# Patient Record
Sex: FEMALE | Race: BLACK OR AFRICAN AMERICAN | Employment: FULL TIME | URBAN - METROPOLITAN AREA
[De-identification: names, ages, dates, MRNs, and addresses within clinical notes are randomized per-mention and may not be internally consistent; named-entity substitution may affect disease eponyms.]

---

## 2017-01-05 ENCOUNTER — OFFICE VISIT (OUTPATIENT)
Dept: INTERNAL MEDICINE CLINIC | Facility: CLINIC | Age: 47
End: 2017-01-05

## 2017-01-05 VITALS
BODY MASS INDEX: 38.37 KG/M2 | DIASTOLIC BLOOD PRESSURE: 88 MMHG | TEMPERATURE: 98 F | OXYGEN SATURATION: 99 % | HEART RATE: 73 BPM | SYSTOLIC BLOOD PRESSURE: 122 MMHG | HEIGHT: 67 IN | WEIGHT: 244.5 LBS | RESPIRATION RATE: 17 BRPM

## 2017-01-05 DIAGNOSIS — K92.1 BLOOD IN STOOL: Primary | ICD-10-CM

## 2017-01-05 PROCEDURE — 99214 OFFICE O/P EST MOD 30 MIN: CPT | Performed by: INTERNAL MEDICINE

## 2017-01-05 NOTE — PROGRESS NOTES
Patient presents with:  Blood In Stool: blood in stools x a couple weeks  Back Pain      HPI: The pt presents today for BRBPR. Onset = 2-3 weeks. No prior hx. Noticed blood on the toilet paper \"about 80% of the time. \"  Some constipation. No diarrhea.

## 2017-01-05 NOTE — PATIENT INSTRUCTIONS
When You Have Gastrointestinal (GI) Bleeding    Blood in your vomit or stool can be a sign of gastrointestinal (GI) bleeding. GI bleeding can be scary. But the cause may not be serious. You should always see a doctor if GI bleeding occurs.   The GI tract · Blood tests. A blood sample is taken and sent to a lab for exam.  · Hemoccult test. Checks a stool sample for blood. · Stool culture. Checks a stool sample for bacteria or parasites. · X-ray, ultrasound, or CT scan.  Imaging tests that take pictures of © 8446-1420 [address], [address] [place]. All rights reserved. This information is not intended as a substitute for professional medical care. Always follow your healthcare professional's instructions.

## 2017-01-30 ENCOUNTER — OFFICE VISIT (OUTPATIENT)
Dept: SURGERY | Facility: CLINIC | Age: 47
End: 2017-01-30

## 2017-01-30 VITALS
DIASTOLIC BLOOD PRESSURE: 73 MMHG | BODY MASS INDEX: 39.21 KG/M2 | RESPIRATION RATE: 16 BRPM | WEIGHT: 244 LBS | HEART RATE: 85 BPM | TEMPERATURE: 98 F | HEIGHT: 66 IN | SYSTOLIC BLOOD PRESSURE: 122 MMHG

## 2017-01-30 DIAGNOSIS — Z80.0 FAMILY HISTORY OF MALIGNANT NEOPLASM OF COLON: Primary | ICD-10-CM

## 2017-01-30 DIAGNOSIS — K92.1 BLOOD IN STOOL: ICD-10-CM

## 2017-01-30 DIAGNOSIS — E66.9 OBESITY (BMI 30-39.9): ICD-10-CM

## 2017-01-30 PROCEDURE — 99243 OFF/OP CNSLTJ NEW/EST LOW 30: CPT | Performed by: SURGERY

## 2017-01-30 RX ORDER — POLYETHYLENE GLYCOL 3350, SODIUM CHLORIDE, SODIUM BICARBONATE, POTASSIUM CHLORIDE 420; 11.2; 5.72; 1.48 G/4L; G/4L; G/4L; G/4L
POWDER, FOR SOLUTION ORAL
Qty: 1 BOTTLE | Refills: 0 | Status: SHIPPED | OUTPATIENT
Start: 2017-01-30 | End: 2017-03-03

## 2017-01-30 NOTE — H&P
New Patient Visit Note       Active Problems      1. Family history of malignant neoplasm of colon    2. Blood in stool    3. Obesity (BMI 30-39. 9)        Chief Complaint   Anal Problem (GI)    History of Present Illness     Pt seen at the request of Dr. Willie Overton History  Occupation          Employer            Comment               principal                                   Social History Main Topics    Smoking Status: Never Smoker                      Alcohol Use: Yes           0.6 oz/week       1 Standard drink 12/01/2011  Physical Exam   Constitutional: She appears well-developed and well-nourished. No distress. HENT:   Head: Normocephalic. Neck: Normal range of motion. No tracheal deviation present.    Cardiovascular: Normal rate, regular rhythm and normal h

## 2017-03-03 PROBLEM — K64.8 INTERNAL HEMORRHOIDS WITHOUT COMPLICATION: Status: ACTIVE | Noted: 2017-03-03

## 2017-07-11 ENCOUNTER — OFFICE VISIT (OUTPATIENT)
Dept: INTERNAL MEDICINE CLINIC | Facility: CLINIC | Age: 47
End: 2017-07-11

## 2017-07-11 VITALS
HEIGHT: 66 IN | BODY MASS INDEX: 40.02 KG/M2 | DIASTOLIC BLOOD PRESSURE: 82 MMHG | TEMPERATURE: 98 F | SYSTOLIC BLOOD PRESSURE: 120 MMHG | RESPIRATION RATE: 16 BRPM | WEIGHT: 249 LBS | HEART RATE: 88 BPM

## 2017-07-11 DIAGNOSIS — N76.0 ACUTE VAGINITIS: Primary | ICD-10-CM

## 2017-07-11 DIAGNOSIS — Z80.0 FAMILY HISTORY OF COLON CANCER: ICD-10-CM

## 2017-07-11 DIAGNOSIS — Z80.3 FAMILY HISTORY OF BREAST CANCER: ICD-10-CM

## 2017-07-11 DIAGNOSIS — E66.01 MORBID OBESITY WITH BMI OF 40.0-44.9, ADULT (HCC): ICD-10-CM

## 2017-07-11 DIAGNOSIS — N89.8 VAGINAL DRYNESS: ICD-10-CM

## 2017-07-11 PROCEDURE — 87591 N.GONORRHOEAE DNA AMP PROB: CPT | Performed by: PHYSICIAN ASSISTANT

## 2017-07-11 PROCEDURE — 87491 CHLMYD TRACH DNA AMP PROBE: CPT | Performed by: PHYSICIAN ASSISTANT

## 2017-07-11 PROCEDURE — 87660 TRICHOMONAS VAGIN DIR PROBE: CPT | Performed by: PHYSICIAN ASSISTANT

## 2017-07-11 PROCEDURE — 87480 CANDIDA DNA DIR PROBE: CPT | Performed by: PHYSICIAN ASSISTANT

## 2017-07-11 PROCEDURE — 99214 OFFICE O/P EST MOD 30 MIN: CPT | Performed by: PHYSICIAN ASSISTANT

## 2017-07-11 PROCEDURE — 87510 GARDNER VAG DNA DIR PROBE: CPT | Performed by: PHYSICIAN ASSISTANT

## 2017-07-11 NOTE — PATIENT INSTRUCTIONS
Vaginal Symptoms:  - may try a probiotic or yogurt daily to replace the \"good bacteria\"  - wear cotton underwear  - avoid tight fitting pants  - drink plenty of water each day  - may try Estroven (over the counter)    Follow up visit in September for you

## 2017-07-11 NOTE — PROGRESS NOTES
HPI:  Chyna Little is a 48year old female who presents for vaginal symptoms x 2 weeks. C/o vaginal itching, discharge - clear to white, no odor. Denies fever, chills, dysuria, hematuria, increased urinary urgency, frequency.   Tried 7 days of Monistat hepatosplenomegaly  BACK: no CVA tenderness  : normal external genitalia, no skin lesions or rashes noted, vaginal canal noted with white clumpy discharge    ASSESSMENT AND PLAN:  # Vaginitis: Sureswab & gc/chlamydia pending.   If positive for BV will be

## 2017-07-12 LAB
C TRACH DNA SPEC QL NAA+PROBE: NEGATIVE
N GONORRHOEA DNA SPEC QL NAA+PROBE: NEGATIVE

## 2017-07-12 RX ORDER — METRONIDAZOLE 500 MG/1
500 TABLET ORAL 2 TIMES DAILY
Qty: 14 TABLET | Refills: 0 | Status: SHIPPED | OUTPATIENT
Start: 2017-07-12 | End: 2017-07-19

## 2017-07-12 RX ORDER — FLUCONAZOLE 150 MG/1
TABLET ORAL
Qty: 2 TABLET | Refills: 0 | Status: SHIPPED | OUTPATIENT
Start: 2017-07-12 | End: 2017-10-12 | Stop reason: ALTCHOICE

## 2017-07-12 RX ORDER — METRONIDAZOLE 7.5 MG/G
GEL VAGINAL
Qty: 1 TUBE | Refills: 3 | Status: SHIPPED | OUTPATIENT
Start: 2017-07-12 | End: 2017-10-12 | Stop reason: ALTCHOICE

## 2017-10-12 ENCOUNTER — OFFICE VISIT (OUTPATIENT)
Dept: INTERNAL MEDICINE CLINIC | Facility: CLINIC | Age: 47
End: 2017-10-12

## 2017-10-12 VITALS
TEMPERATURE: 98 F | DIASTOLIC BLOOD PRESSURE: 80 MMHG | HEIGHT: 66 IN | SYSTOLIC BLOOD PRESSURE: 116 MMHG | WEIGHT: 250.25 LBS | BODY MASS INDEX: 40.22 KG/M2 | HEART RATE: 86 BPM

## 2017-10-12 DIAGNOSIS — E66.01 MORBID OBESITY WITH BMI OF 40.0-44.9, ADULT (HCC): ICD-10-CM

## 2017-10-12 DIAGNOSIS — Z00.00 ROUTINE GENERAL MEDICAL EXAMINATION AT A HEALTH CARE FACILITY: Primary | ICD-10-CM

## 2017-10-12 DIAGNOSIS — Z28.21 INFLUENZA VACCINE REFUSED: ICD-10-CM

## 2017-10-12 DIAGNOSIS — Z12.31 ENCOUNTER FOR SCREENING MAMMOGRAM FOR BREAST CANCER: ICD-10-CM

## 2017-10-12 PROBLEM — Z80.3 FAMILY HISTORY OF BREAST CANCER: Status: ACTIVE | Noted: 2017-10-12

## 2017-10-12 PROBLEM — K64.8 INTERNAL HEMORRHOIDS WITHOUT COMPLICATION: Status: RESOLVED | Noted: 2017-03-03 | Resolved: 2017-10-12

## 2017-10-12 PROBLEM — K92.1 BLOOD IN STOOL: Status: RESOLVED | Noted: 2017-01-05 | Resolved: 2017-10-12

## 2017-10-12 PROCEDURE — 99396 PREV VISIT EST AGE 40-64: CPT | Performed by: INTERNAL MEDICINE

## 2017-10-12 NOTE — PROGRESS NOTES
Patient presents with:  CPX      HPI: The pt presents today for WWE + CBE but minus the pap. Health goals still center around longevity, vitality, and QOL. She's due for wellness labs. She's due for mammo as well.   She's up to date on C-scope (done stacey [OTHER] Other      fam hx   • Hypertension Brother    • Obesity Other      fam hx   • Obesity Brother    • Cancer Sister 43     breast CA   • OA [OTHER] Sister    • Breast Cancer Paternal Aunt    • Breast Cancer Paternal Aunt          Immunization History Encounter      HGB A1C - CPX      LIPID PANEL - CPX      TSH - CPX      URINALYSIS, ROUTINE - CPX      VITAMIN D, 25-HYDROXY - CPX      CBC W/DIFF - CPX      COMP METABOLIC PANEL - CPX      Flulaval 0.5 ml >= 6 months Quad single dose Pres Free (22795)

## 2017-10-13 ENCOUNTER — HOSPITAL ENCOUNTER (OUTPATIENT)
Dept: MAMMOGRAPHY | Age: 47
Discharge: HOME | End: 2017-10-13
Attending: INTERNAL MEDICINE
Payer: COMMERCIAL

## 2017-10-13 DIAGNOSIS — Z12.31 ENCOUNTER FOR SCREENING MAMMOGRAM FOR BREAST CANCER: ICD-10-CM

## 2017-10-13 PROCEDURE — 77063 BREAST TOMOSYNTHESIS BI: CPT | Performed by: INTERNAL MEDICINE

## 2017-10-13 PROCEDURE — 77067 SCR MAMMO BI INCL CAD: CPT | Performed by: INTERNAL MEDICINE

## 2017-10-16 ENCOUNTER — LAB ENCOUNTER (OUTPATIENT)
Dept: LAB | Age: 47
End: 2017-10-16
Attending: INTERNAL MEDICINE
Payer: COMMERCIAL

## 2017-10-16 DIAGNOSIS — Z00.00 ROUTINE GENERAL MEDICAL EXAMINATION AT A HEALTH CARE FACILITY: ICD-10-CM

## 2017-10-16 DIAGNOSIS — E55.9 VITAMIN D DEFICIENCY: Primary | ICD-10-CM

## 2017-10-16 PROCEDURE — 83036 HEMOGLOBIN GLYCOSYLATED A1C: CPT | Performed by: INTERNAL MEDICINE

## 2017-10-16 PROCEDURE — 80061 LIPID PANEL: CPT | Performed by: INTERNAL MEDICINE

## 2017-10-16 PROCEDURE — 36415 COLL VENOUS BLD VENIPUNCTURE: CPT | Performed by: INTERNAL MEDICINE

## 2017-10-16 PROCEDURE — 80050 GENERAL HEALTH PANEL: CPT | Performed by: INTERNAL MEDICINE

## 2017-10-16 PROCEDURE — 81001 URINALYSIS AUTO W/SCOPE: CPT | Performed by: INTERNAL MEDICINE

## 2017-10-16 PROCEDURE — 82306 VITAMIN D 25 HYDROXY: CPT | Performed by: INTERNAL MEDICINE

## 2017-10-16 RX ORDER — ERGOCALCIFEROL 1.25 MG/1
50000 CAPSULE ORAL
Qty: 12 CAPSULE | Refills: 0 | Status: SHIPPED | OUTPATIENT
Start: 2017-10-16 | End: 2018-01-22 | Stop reason: ALTCHOICE

## 2017-10-16 NOTE — PROGRESS NOTES
Script for Vitamin D sent to her pharmacy. Marques Nieto. Miriam Mak MD  Diplomate, American Board of Internal Medicine  [facility]  [address] [facility],[address], [address], [facility], [address] [place] [address]  T: [phone]; F: Cristianeti 5

## 2018-01-22 ENCOUNTER — OFFICE VISIT (OUTPATIENT)
Dept: INTERNAL MEDICINE CLINIC | Facility: CLINIC | Age: 48
End: 2018-01-22

## 2018-01-22 VITALS
DIASTOLIC BLOOD PRESSURE: 80 MMHG | SYSTOLIC BLOOD PRESSURE: 112 MMHG | WEIGHT: 256.5 LBS | TEMPERATURE: 98 F | HEART RATE: 80 BPM | HEIGHT: 66 IN | BODY MASS INDEX: 41.22 KG/M2

## 2018-01-22 DIAGNOSIS — E78.00 HYPERCHOLESTEREMIA: ICD-10-CM

## 2018-01-22 DIAGNOSIS — E66.01 MORBID OBESITY WITH BMI OF 40.0-44.9, ADULT (HCC): ICD-10-CM

## 2018-01-22 DIAGNOSIS — E55.9 VITAMIN D DEFICIENCY: ICD-10-CM

## 2018-01-22 DIAGNOSIS — R68.89 FLU-LIKE SYMPTOMS: Primary | ICD-10-CM

## 2018-01-22 PROCEDURE — 99214 OFFICE O/P EST MOD 30 MIN: CPT | Performed by: INTERNAL MEDICINE

## 2018-01-22 RX ORDER — OSELTAMIVIR PHOSPHATE 75 MG/1
75 CAPSULE ORAL 2 TIMES DAILY
Qty: 10 CAPSULE | Refills: 0 | Status: SHIPPED | OUTPATIENT
Start: 2018-01-22 | End: 2018-01-27

## 2018-01-22 NOTE — PROGRESS NOTES
Daniela Miranda is a 50year old female. HPI:   Patient presents with:  Headache: headaches, nause  Body ache and/or chills: hot and chills   Patient presents with flu-like symptoms. Headaches, nausea, body aches, chills, chest tightness.     A lot of of alcohol per week . She reports that she does not use drugs.   Wt Readings from Last 6 Encounters:  01/22/18 : 256 lb 8 oz  10/12/17 : 250 lb 4 oz  07/11/17 : 249 lb  03/03/17 : 210 lb  01/30/17 : 244 lb  01/05/17 : 244 lb 8 oz    EXAM:   /80 (BP Lo clinic in October 2018 with Dr. Franklin Ayala MD for follow up on chronic issues, or earlier if acute issues arise.     Desire Sharpe MD

## 2018-06-12 ENCOUNTER — TELEPHONE (OUTPATIENT)
Dept: INTERNAL MEDICINE CLINIC | Facility: CLINIC | Age: 48
End: 2018-06-12

## 2018-06-12 NOTE — TELEPHONE ENCOUNTER
Patient dropped off Health Exam Form from employer, [employer], to be signed. Form sent to [place] with daily mail/fax.   Advise patient when ready for pickup

## 2018-07-03 ENCOUNTER — TELEPHONE (OUTPATIENT)
Dept: INTERNAL MEDICINE CLINIC | Facility: CLINIC | Age: 48
End: 2018-07-03

## 2018-07-03 ENCOUNTER — OFFICE VISIT (OUTPATIENT)
Dept: INTERNAL MEDICINE CLINIC | Facility: CLINIC | Age: 48
End: 2018-07-03

## 2018-07-03 VITALS
WEIGHT: 255.5 LBS | BODY MASS INDEX: 41 KG/M2 | HEART RATE: 67 BPM | OXYGEN SATURATION: 97 % | RESPIRATION RATE: 16 BRPM | DIASTOLIC BLOOD PRESSURE: 78 MMHG | SYSTOLIC BLOOD PRESSURE: 108 MMHG | TEMPERATURE: 98 F

## 2018-07-03 DIAGNOSIS — M25.561 ACUTE PAIN OF RIGHT KNEE: Primary | ICD-10-CM

## 2018-07-03 PROCEDURE — 99213 OFFICE O/P EST LOW 20 MIN: CPT | Performed by: NURSE PRACTITIONER

## 2018-07-03 RX ORDER — FENOPROFEN CALCIUM 200 MG/1
300 CAPSULE ORAL 2 TIMES DAILY
Qty: 28 CAPSULE | Refills: 0 | Status: SHIPPED | OUTPATIENT
Start: 2018-07-03 | End: 2018-07-03 | Stop reason: CLARIF

## 2018-07-03 RX ORDER — FENOPROFEN CALCIUM 200 MG/1
200 CAPSULE ORAL 2 TIMES DAILY
Qty: 28 CAPSULE | Refills: 0 | Status: SHIPPED | OUTPATIENT
Start: 2018-07-03 | End: 2018-08-02

## 2018-07-03 NOTE — TELEPHONE ENCOUNTER
Pharmacy would like a call back medication dos not come in that strength and can not be cut in half Fenoprofen Calcium 200 MG

## 2018-07-03 NOTE — PROGRESS NOTES
CHIEF COMPLAINT:     Patient presents with:  Knee Pain: Right knee pain x 3 weeks. Possibly dt arthritis. Extremely tender especially while walking upstairs. Using Advil with no relief.       HPI:   Daniela Miranda is a 50year old female who presents to NEURO: No numbness, no tingling, No loss of sensation.     EXAM:   /78 (BP Location: Right arm, Patient Position: Sitting, Cuff Size: large)   Pulse 67   Temp 98 °F (36.7 °C) (Oral)   Resp 16   Wt 255 lb 8 oz   LMP 12/01/2011   SpO2 97%   Breastfeedin R. I.C.E. stands for Rest, Ice, Compression, and Elevation. Doing these things helps limit pain and swelling after an injury. R.I.C.E. also helps injuries heal faster. Use R.I.C.E. for sprains, strains, and severe bruises or bumps.  Follow the tips on this h The patient indicates understanding of these issues and agrees to the plan.

## 2018-07-10 ENCOUNTER — TELEPHONE (OUTPATIENT)
Dept: INTERNAL MEDICINE CLINIC | Facility: CLINIC | Age: 48
End: 2018-07-10

## 2018-07-10 NOTE — TELEPHONE ENCOUNTER
Spoke with Jenniffer Garland, pharmacist at [facility]. He is asking if provider is ok with Naproxen 500mg 1 tab BID 28 tabs. Spoke with DAVID Yeboah who stated that was ok as long as went through insurance and if not for pt to take OTC aleve BID.  Pharmacist ilya

## 2018-07-10 NOTE — TELEPHONE ENCOUNTER
That was the med insurance wanted. Please just have pt take OTC aleve BID as instructed. Please tell her it is equivalent.

## 2018-07-10 NOTE — TELEPHONE ENCOUNTER
[facility] pharmacy called requesting alternative medication for anti inflammatory. Patient was prescribed Fenoprofen Calcium 200 MG Oral Cap on 7/3/18, but pharmacy only carries brand name and is too expensive for patient.      [address] [facility] [id number] -

## 2018-09-20 ENCOUNTER — HOSPITAL ENCOUNTER (OUTPATIENT)
Dept: GENERAL RADIOLOGY | Age: 48
Discharge: HOME | End: 2018-09-20
Attending: INTERNAL MEDICINE
Payer: COMMERCIAL

## 2018-09-20 ENCOUNTER — OFFICE VISIT (OUTPATIENT)
Dept: INTERNAL MEDICINE CLINIC | Facility: CLINIC | Age: 48
End: 2018-09-20
Payer: COMMERCIAL

## 2018-09-20 VITALS
RESPIRATION RATE: 18 BRPM | OXYGEN SATURATION: 95 % | HEART RATE: 72 BPM | DIASTOLIC BLOOD PRESSURE: 80 MMHG | TEMPERATURE: 98 F | SYSTOLIC BLOOD PRESSURE: 122 MMHG | BODY MASS INDEX: 41.18 KG/M2 | HEIGHT: 66 IN | WEIGHT: 256.25 LBS

## 2018-09-20 DIAGNOSIS — M25.562 ACUTE PAIN OF BOTH KNEES: Primary | ICD-10-CM

## 2018-09-20 DIAGNOSIS — M25.561 ACUTE PAIN OF BOTH KNEES: ICD-10-CM

## 2018-09-20 DIAGNOSIS — M25.561 ACUTE PAIN OF BOTH KNEES: Primary | ICD-10-CM

## 2018-09-20 DIAGNOSIS — M25.562 ACUTE PAIN OF BOTH KNEES: ICD-10-CM

## 2018-09-20 PROCEDURE — 99214 OFFICE O/P EST MOD 30 MIN: CPT | Performed by: INTERNAL MEDICINE

## 2018-09-20 PROCEDURE — 73565 X-RAY EXAM OF KNEES: CPT | Performed by: INTERNAL MEDICINE

## 2018-09-20 RX ORDER — MELOXICAM 7.5 MG/1
7.5 TABLET ORAL DAILY
Qty: 30 TABLET | Refills: 1 | Status: SHIPPED | OUTPATIENT
Start: 2018-09-20 | End: 2019-03-27 | Stop reason: ALTCHOICE

## 2018-09-20 NOTE — PATIENT INSTRUCTIONS
- Get x-ray done of knees  - Start meloxicam (anti-inflammatory). Take 1 tablet daily with food.   - Based on x-ray results and course of your symptoms, we may have you follow up with an orthopedics specialist.    It was a pleasure seeing you in the clinic

## 2018-09-20 NOTE — PROGRESS NOTES
Edwin Cabello is a 50year old female. HPI:   Patient presents with:  Knee Pain: Bilateral knee pain - began in june - right more than  left - Given Naproxen but did not completely take pain away. Patient presents with pain in both knees, right>left. other family member. Social:  reports that  has never smoked. she has never used smokeless tobacco. She reports that she drinks alcohol. She reports that she does not use drugs.   Wt Readings from Last 6 Encounters:  09/20/18 : 256 lb 4 oz  07/03/18 : 255

## 2018-11-01 ENCOUNTER — OFFICE VISIT (OUTPATIENT)
Dept: INTERNAL MEDICINE CLINIC | Facility: CLINIC | Age: 48
End: 2018-11-01
Payer: COMMERCIAL

## 2018-11-01 VITALS
TEMPERATURE: 99 F | WEIGHT: 247 LBS | SYSTOLIC BLOOD PRESSURE: 124 MMHG | RESPIRATION RATE: 16 BRPM | BODY MASS INDEX: 39.7 KG/M2 | HEART RATE: 78 BPM | DIASTOLIC BLOOD PRESSURE: 84 MMHG | HEIGHT: 66 IN

## 2018-11-01 DIAGNOSIS — Z00.00 PREVENTATIVE HEALTH CARE: ICD-10-CM

## 2018-11-01 DIAGNOSIS — Z12.39 ENCOUNTER FOR SCREENING FOR MALIGNANT NEOPLASM OF BREAST: ICD-10-CM

## 2018-11-01 DIAGNOSIS — N89.8 VAGINAL DISCHARGE: Primary | ICD-10-CM

## 2018-11-01 PROCEDURE — 87480 CANDIDA DNA DIR PROBE: CPT | Performed by: INTERNAL MEDICINE

## 2018-11-01 PROCEDURE — 87491 CHLMYD TRACH DNA AMP PROBE: CPT | Performed by: INTERNAL MEDICINE

## 2018-11-01 PROCEDURE — 99213 OFFICE O/P EST LOW 20 MIN: CPT | Performed by: INTERNAL MEDICINE

## 2018-11-01 PROCEDURE — 87510 GARDNER VAG DNA DIR PROBE: CPT | Performed by: INTERNAL MEDICINE

## 2018-11-01 PROCEDURE — 87591 N.GONORRHOEAE DNA AMP PROB: CPT | Performed by: INTERNAL MEDICINE

## 2018-11-01 PROCEDURE — 87660 TRICHOMONAS VAGIN DIR PROBE: CPT | Performed by: INTERNAL MEDICINE

## 2018-11-01 RX ORDER — FLUCONAZOLE 150 MG/1
150 TABLET ORAL ONCE
Qty: 2 TABLET | Refills: 0 | Status: SHIPPED | OUTPATIENT
Start: 2018-11-01 | End: 2018-11-01

## 2018-11-01 NOTE — PATIENT INSTRUCTIONS
- Start yeast infection medication. Take 1 tablet now, and 1 tablet in 3 days if symptoms are still present. - We will contact you with the results of your swabs. It was a pleasure seeing you in the clinic today.   Thank you for choosing the [facility]

## 2018-11-01 NOTE — PROGRESS NOTES
Yuki Nash is a 50year old female. HPI:   Patient presents with:  Yeast Infection  Patient presents with complaints of vaginal discharge. She has been dealing with vaginal itching and light whitish discharge.   Symptoms have been going on for 3-4 reports that she does not use drugs.   Wt Readings from Last 6 Encounters:  11/01/18 : 247 lb  09/20/18 : 256 lb 4 oz  07/03/18 : 255 lb 8 oz  01/22/18 : 256 lb 8 oz  10/12/17 : 250 lb 4 oz  07/11/17 : 249 lb    EXAM:   /84 (BP Location: Left arm, Brenda Regalados

## 2018-11-05 ENCOUNTER — TELEPHONE (OUTPATIENT)
Dept: INTERNAL MEDICINE CLINIC | Facility: CLINIC | Age: 48
End: 2018-11-05

## 2019-03-27 ENCOUNTER — OFFICE VISIT (OUTPATIENT)
Dept: INTERNAL MEDICINE CLINIC | Facility: CLINIC | Age: 49
End: 2019-03-27
Payer: COMMERCIAL

## 2019-03-27 VITALS
RESPIRATION RATE: 12 BRPM | HEART RATE: 84 BPM | DIASTOLIC BLOOD PRESSURE: 84 MMHG | HEIGHT: 66 IN | BODY MASS INDEX: 41.3 KG/M2 | WEIGHT: 257 LBS | SYSTOLIC BLOOD PRESSURE: 120 MMHG | TEMPERATURE: 98 F

## 2019-03-27 DIAGNOSIS — M79.602 LEFT ARM PAIN: Primary | ICD-10-CM

## 2019-03-27 DIAGNOSIS — M72.2 PLANTAR FASCIITIS OF RIGHT FOOT: ICD-10-CM

## 2019-03-27 DIAGNOSIS — Z00.00 PREVENTATIVE HEALTH CARE: ICD-10-CM

## 2019-03-27 PROCEDURE — 99214 OFFICE O/P EST MOD 30 MIN: CPT | Performed by: INTERNAL MEDICINE

## 2019-03-27 RX ORDER — METHYLPREDNISOLONE 4 MG/1
TABLET ORAL
Qty: 1 KIT | Refills: 0 | Status: SHIPPED | OUTPATIENT
Start: 2019-03-27 | End: 2019-09-05 | Stop reason: ALTCHOICE

## 2019-03-27 NOTE — PROGRESS NOTES
Casandra Ovalle is a 46year old female. HPI:   Patient presents with:  Arm Pain: Left fore arm pain  Heel Pain: Right side heel pain   Other: Due for Mammogram (order in epic / advised to make appt) - pt inquiring about regular yearly lab work.    Adal of onset: 43) in her sister; Breast Cancer (age of onset: 39) in her paternal aunt; Cancer (age of onset: 43) in her sister; Cancer (age of onset: 48) in her paternal aunt;  Cancer (age of onset: 61) in her father; Diabetes in her father; Glaucoma in her fa order already in system. Patient with hysterectomy. - CBC WITH DIFFERENTIAL WITH PLATELET; Future  - COMP METABOLIC PANEL (14); Future  - HEMOGLOBIN A1C; Future  - LIPID PANEL;  Future  - TSH W REFLEX TO FREE T4; Future  - URINALYSIS, ROUTINE; Future  \

## 2019-03-27 NOTE — PATIENT INSTRUCTIONS
- Start anti-inflammatory (diclofenac). Take 1 tablet every 8 hours as needed. - Start steroid pack (methylprednisolone). Take as prescribed.   - Continue with plantar fasciitis exercises  - If your arm pain is not better in two weeks, follow up with [facility]

## 2019-03-31 PROBLEM — M72.2 PLANTAR FASCIITIS OF RIGHT FOOT: Status: ACTIVE | Noted: 2019-03-31

## 2019-04-05 ENCOUNTER — LAB ENCOUNTER (OUTPATIENT)
Dept: LAB | Age: 49
End: 2019-04-05
Attending: INTERNAL MEDICINE
Payer: COMMERCIAL

## 2019-04-05 ENCOUNTER — HOSPITAL ENCOUNTER (OUTPATIENT)
Dept: MAMMOGRAPHY | Age: 49
Discharge: HOME | End: 2019-04-05
Attending: INTERNAL MEDICINE
Payer: COMMERCIAL

## 2019-04-05 DIAGNOSIS — Z00.00 PREVENTATIVE HEALTH CARE: ICD-10-CM

## 2019-04-05 DIAGNOSIS — Z12.39 ENCOUNTER FOR SCREENING FOR MALIGNANT NEOPLASM OF BREAST: ICD-10-CM

## 2019-04-05 PROCEDURE — 80053 COMPREHEN METABOLIC PANEL: CPT | Performed by: INTERNAL MEDICINE

## 2019-04-05 PROCEDURE — 77067 SCR MAMMO BI INCL CAD: CPT | Performed by: INTERNAL MEDICINE

## 2019-04-05 PROCEDURE — 83036 HEMOGLOBIN GLYCOSYLATED A1C: CPT | Performed by: INTERNAL MEDICINE

## 2019-04-05 PROCEDURE — 80061 LIPID PANEL: CPT | Performed by: INTERNAL MEDICINE

## 2019-04-05 PROCEDURE — 81001 URINALYSIS AUTO W/SCOPE: CPT | Performed by: INTERNAL MEDICINE

## 2019-04-05 PROCEDURE — 77063 BREAST TOMOSYNTHESIS BI: CPT | Performed by: INTERNAL MEDICINE

## 2019-04-05 PROCEDURE — 36415 COLL VENOUS BLD VENIPUNCTURE: CPT | Performed by: INTERNAL MEDICINE

## 2019-05-08 ENCOUNTER — TELEPHONE (OUTPATIENT)
Dept: INTERNAL MEDICINE CLINIC | Facility: CLINIC | Age: 49
End: 2019-05-08

## 2019-05-08 NOTE — TELEPHONE ENCOUNTER
Pt remains with left arm pain despite Diclofenac. Pt  lost the information for the Ortho Dr Ruthie Magana referred her to. Who do you recommend?

## 2019-05-08 NOTE — TELEPHONE ENCOUNTER
Pt would like a call back from nurse is still having arm pain and would like to see Dr Jossue Hernandez did offer her an apt with Dr Jeanie Summers but pt declined

## 2019-05-08 NOTE — TELEPHONE ENCOUNTER
Jean [address]  [address] [facility], [address]  Phone:  [phone]    If that is too far away she can try Dr. Mary Ann Dunn, [address] [facility],[address]  [address]  Office Phone: [phone]

## 2019-09-05 ENCOUNTER — OFFICE VISIT (OUTPATIENT)
Dept: INTERNAL MEDICINE CLINIC | Facility: CLINIC | Age: 49
End: 2019-09-05
Payer: COMMERCIAL

## 2019-09-05 VITALS
HEART RATE: 64 BPM | DIASTOLIC BLOOD PRESSURE: 76 MMHG | RESPIRATION RATE: 16 BRPM | HEIGHT: 66 IN | WEIGHT: 257 LBS | TEMPERATURE: 98 F | SYSTOLIC BLOOD PRESSURE: 108 MMHG | BODY MASS INDEX: 41.3 KG/M2

## 2019-09-05 DIAGNOSIS — B37.9 YEAST INFECTION: Primary | ICD-10-CM

## 2019-09-05 PROCEDURE — 99213 OFFICE O/P EST LOW 20 MIN: CPT | Performed by: INTERNAL MEDICINE

## 2019-09-05 RX ORDER — FLUCONAZOLE 150 MG/1
TABLET ORAL
Qty: 2 TABLET | Refills: 0 | Status: SHIPPED | OUTPATIENT
Start: 2019-09-05 | End: 2020-01-31 | Stop reason: ALTCHOICE

## 2019-09-05 NOTE — PROGRESS NOTES
Patient presents with:  Yeast Infection: vaginal itching and white d/c      HPI: Hansel Li presents today for < 1 wk of vaginal itching and white discharge 2/2 to what she believes is a yeast infection.   Has been treated with Diflucan in the past (3/2016 was la (DIFLUCAN) 150 MG Oral Tab 2 tablet 0     Sig: Take 1 tablet today and take the 2nd tablet on day #4 for treatment of yeast infection.        Imaging & Consults:  None

## 2019-09-05 NOTE — PATIENT INSTRUCTIONS
Vaginal Infection: Understanding the Vaginal Environment  The vagina is a canal. It connects the uterus (womb) to the outside of the body. It is home to many types of bacteria and other tiny organisms.  These different bacteria most often stay balanced in © 2098-2685 [address] [facility] [place] [facility], [address] [place]. All rights reserved. This information is not intended as a substitute for professional medical care. Always follow your healthcare professional's instructions.         Vaginal © 8169-0202 [address]. [address] [facility], [address] [place]. All rights reserved. This information is not intended as a substitute for professional medical care. Always follow your healthcare professional's instructions.         Yeast I · You have new pain in the lower belly or pelvic region. · You have side effects that bother you or a reaction to the cream or pills you’re prescribed. · You or any partners you have sex with have new symptoms, such as a rash, joint pain, or sores.   Date

## 2020-01-31 ENCOUNTER — LAB ENCOUNTER (OUTPATIENT)
Dept: LAB | Age: 50
End: 2020-01-31
Attending: INTERNAL MEDICINE
Payer: COMMERCIAL

## 2020-01-31 ENCOUNTER — OFFICE VISIT (OUTPATIENT)
Dept: INTERNAL MEDICINE CLINIC | Facility: CLINIC | Age: 50
End: 2020-01-31
Payer: COMMERCIAL

## 2020-01-31 VITALS
RESPIRATION RATE: 16 BRPM | HEART RATE: 88 BPM | BODY MASS INDEX: 42.27 KG/M2 | WEIGHT: 263 LBS | TEMPERATURE: 99 F | DIASTOLIC BLOOD PRESSURE: 86 MMHG | SYSTOLIC BLOOD PRESSURE: 120 MMHG | HEIGHT: 66 IN

## 2020-01-31 DIAGNOSIS — R11.0 NAUSEA: ICD-10-CM

## 2020-01-31 DIAGNOSIS — E78.00 HYPERCHOLESTEREMIA: ICD-10-CM

## 2020-01-31 DIAGNOSIS — R73.03 PREDIABETES: ICD-10-CM

## 2020-01-31 DIAGNOSIS — H93.13 TINNITUS OF BOTH EARS: ICD-10-CM

## 2020-01-31 DIAGNOSIS — E55.9 VITAMIN D DEFICIENCY: ICD-10-CM

## 2020-01-31 DIAGNOSIS — H53.8 BLURRED VISION, BILATERAL: ICD-10-CM

## 2020-01-31 DIAGNOSIS — R51.9 BILATERAL HEADACHES: ICD-10-CM

## 2020-01-31 DIAGNOSIS — R42 LIGHTHEADEDNESS: Primary | ICD-10-CM

## 2020-01-31 DIAGNOSIS — R42 LIGHTHEADEDNESS: ICD-10-CM

## 2020-01-31 LAB
ALBUMIN SERPL-MCNC: 3.9 G/DL (ref 3.4–5)
ALBUMIN/GLOB SERPL: 0.8 {RATIO} (ref 1–2)
ALP LIVER SERPL-CCNC: 78 U/L (ref 41–108)
ALT SERPL-CCNC: 37 U/L (ref 13–56)
ANION GAP SERPL CALC-SCNC: 5 MMOL/L (ref 0–18)
AST SERPL-CCNC: 17 U/L (ref 15–37)
BASOPHILS # BLD AUTO: 0.04 X10(3) UL (ref 0–0.2)
BASOPHILS NFR BLD AUTO: 0.6 %
BILIRUB SERPL-MCNC: 0.3 MG/DL (ref 0.1–2)
BILIRUB UR QL STRIP.AUTO: NEGATIVE
BUN BLD-MCNC: 12 MG/DL (ref 7–18)
BUN/CREAT SERPL: 13.6 (ref 10–20)
CALCIUM BLD-MCNC: 9.9 MG/DL (ref 8.5–10.1)
CHLORIDE SERPL-SCNC: 106 MMOL/L (ref 98–112)
CO2 SERPL-SCNC: 27 MMOL/L (ref 21–32)
COLOR UR AUTO: YELLOW
CREAT BLD-MCNC: 0.88 MG/DL (ref 0.55–1.02)
DEPRECATED RDW RBC AUTO: 46.8 FL (ref 35.1–46.3)
EOSINOPHIL # BLD AUTO: 0.07 X10(3) UL (ref 0–0.7)
EOSINOPHIL NFR BLD AUTO: 1 %
ERYTHROCYTE [DISTWIDTH] IN BLOOD BY AUTOMATED COUNT: 13.9 % (ref 11–15)
EST. AVERAGE GLUCOSE BLD GHB EST-MCNC: 105 MG/DL (ref 68–126)
GLOBULIN PLAS-MCNC: 4.7 G/DL (ref 2.8–4.4)
GLUCOSE BLD-MCNC: 83 MG/DL (ref 70–99)
GLUCOSE UR STRIP.AUTO-MCNC: NEGATIVE MG/DL
HBA1C MFR BLD HPLC: 5.3 % (ref ?–5.7)
HCT VFR BLD AUTO: 45.6 % (ref 35–48)
HGB BLD-MCNC: 14.8 G/DL (ref 12–16)
IMM GRANULOCYTES # BLD AUTO: 0.02 X10(3) UL (ref 0–1)
IMM GRANULOCYTES NFR BLD: 0.3 %
KETONES UR STRIP.AUTO-MCNC: NEGATIVE MG/DL
LEUKOCYTE ESTERASE UR QL STRIP.AUTO: NEGATIVE
LYMPHOCYTES # BLD AUTO: 2.13 X10(3) UL (ref 1–4)
LYMPHOCYTES NFR BLD AUTO: 31.6 %
M PROTEIN MFR SERPL ELPH: 8.6 G/DL (ref 6.4–8.2)
MCH RBC QN AUTO: 29.7 PG (ref 26–34)
MCHC RBC AUTO-ENTMCNC: 32.5 G/DL (ref 31–37)
MCV RBC AUTO: 91.4 FL (ref 80–100)
MONOCYTES # BLD AUTO: 0.55 X10(3) UL (ref 0.1–1)
MONOCYTES NFR BLD AUTO: 8.1 %
NEUTROPHILS # BLD AUTO: 3.94 X10 (3) UL (ref 1.5–7.7)
NEUTROPHILS # BLD AUTO: 3.94 X10(3) UL (ref 1.5–7.7)
NEUTROPHILS NFR BLD AUTO: 58.4 %
NITRITE UR QL STRIP.AUTO: NEGATIVE
OSMOLALITY SERPL CALC.SUM OF ELEC: 285 MOSM/KG (ref 275–295)
PATIENT FASTING Y/N/NP: NO
PH UR STRIP.AUTO: 5 [PH] (ref 4.5–8)
PLATELET # BLD AUTO: 267 10(3)UL (ref 150–450)
POTASSIUM SERPL-SCNC: 3.8 MMOL/L (ref 3.5–5.1)
PROT UR STRIP.AUTO-MCNC: NEGATIVE MG/DL
RBC # BLD AUTO: 4.99 X10(6)UL (ref 3.8–5.3)
SODIUM SERPL-SCNC: 138 MMOL/L (ref 136–145)
SP GR UR STRIP.AUTO: 1.02 (ref 1–1.03)
TSI SER-ACNC: 1.38 MIU/ML (ref 0.36–3.74)
UROBILINOGEN UR STRIP.AUTO-MCNC: <2 MG/DL
VIT D+METAB SERPL-MCNC: 13.4 NG/ML (ref 30–100)
WBC # BLD AUTO: 6.8 X10(3) UL (ref 4–11)

## 2020-01-31 PROCEDURE — 83036 HEMOGLOBIN GLYCOSYLATED A1C: CPT | Performed by: INTERNAL MEDICINE

## 2020-01-31 PROCEDURE — 81001 URINALYSIS AUTO W/SCOPE: CPT | Performed by: INTERNAL MEDICINE

## 2020-01-31 PROCEDURE — 82306 VITAMIN D 25 HYDROXY: CPT | Performed by: INTERNAL MEDICINE

## 2020-01-31 PROCEDURE — 80050 GENERAL HEALTH PANEL: CPT | Performed by: INTERNAL MEDICINE

## 2020-01-31 PROCEDURE — 36415 COLL VENOUS BLD VENIPUNCTURE: CPT | Performed by: INTERNAL MEDICINE

## 2020-01-31 PROCEDURE — 99214 OFFICE O/P EST MOD 30 MIN: CPT | Performed by: INTERNAL MEDICINE

## 2020-01-31 NOTE — PROGRESS NOTES
Chyna Little is a 44year old female. HPI:   Patient presents with:  Lightheadedness: Pt states symptoms started last Thursday. Ringing in the ears began on Wednesday. States at times she wakes up feeling jittery.    Nausea  Headache  Blurred Vision  R bso, omentectomy w/bela; other surgical history (July 2012); hysterectomy (Dec 2011); colonoscopy (July 2012); and colonoscopy (03/03/2017).   Family: family history includes Breast Cancer in her paternal aunt and paternal aunt; Breast Cancer (age of onset: pleasant, appropriate mood and affect  ASSESSMENT AND PLAN:   1. Lightheadedness  2. Nausea  3. Bilateral headaches  4. Tinnitus of both ears  5. Blurred vision, bilateral  Patient with a host of symptoms for the past week.   Started with sudden episode of patient is asked to return to clinic in 3-6 months with Dr. Cam Jauregui MD for follow up on chronic issues, or earlier if acute issues arise.     Eliane Concepcion MD

## 2020-01-31 NOTE — PATIENT INSTRUCTIONS
- Get blood work done today  - If blood tests are normal/similar to last year's results then we will check a CT scan of your head  - If all testing is normal and your symptoms do not improve, we will have you follow up with our neurologists  - For now, dri

## 2020-02-01 ENCOUNTER — HOSPITAL ENCOUNTER (OUTPATIENT)
Age: 50
Discharge: HOME | End: 2020-02-01
Attending: FAMILY MEDICINE
Payer: COMMERCIAL

## 2020-02-01 VITALS
SYSTOLIC BLOOD PRESSURE: 142 MMHG | HEIGHT: 67 IN | TEMPERATURE: 100 F | DIASTOLIC BLOOD PRESSURE: 59 MMHG | OXYGEN SATURATION: 99 % | BODY MASS INDEX: 34.53 KG/M2 | HEART RATE: 96 BPM | WEIGHT: 220 LBS | RESPIRATION RATE: 18 BRPM

## 2020-02-01 DIAGNOSIS — B34.9 VIRAL SYNDROME: Primary | ICD-10-CM

## 2020-02-01 LAB
POCT INFLUENZA A: NEGATIVE
POCT INFLUENZA B: NEGATIVE

## 2020-02-01 PROCEDURE — 99204 OFFICE O/P NEW MOD 45 MIN: CPT

## 2020-02-01 PROCEDURE — 99213 OFFICE O/P EST LOW 20 MIN: CPT

## 2020-02-01 PROCEDURE — 87502 INFLUENZA DNA AMP PROBE: CPT | Performed by: FAMILY MEDICINE

## 2020-02-01 RX ORDER — ONDANSETRON 4 MG/1
4 TABLET, ORALLY DISINTEGRATING ORAL EVERY 6 HOURS PRN
Qty: 12 TABLET | Refills: 0 | Status: SHIPPED | OUTPATIENT
Start: 2020-02-01 | End: 2020-02-04

## 2020-02-01 RX ORDER — ONDANSETRON 4 MG/1
4 TABLET, ORALLY DISINTEGRATING ORAL ONCE
Status: COMPLETED | OUTPATIENT
Start: 2020-02-01 | End: 2020-02-01

## 2020-02-01 NOTE — ED INITIAL ASSESSMENT (HPI)
Pt here c/o cough for last week, but woke up this am with chills, bodyaches, h/a, nausea. Slight scratchy sore throat.

## 2020-02-01 NOTE — ED PROVIDER NOTES
Patient Seen in: [address] [facility] END      History   Patient presents with:  Cough/URI    Stated Complaint: BODY ACHES/CHILLS/NAUSEA X 2 DAYS    HPI  This is a 13-year-old female coming in with a cough that she is had for the last 1 week, Pulse 96   Temp 100.3 °F (37.9 °C) (Tympanic)   Resp 18   Ht 170.2 cm (5' 7\")   Wt 99.8 kg   LMP 12/01/2011   SpO2 99%   BMI 34.46 kg/m²         Physical Exam  GEN: Not in any acute distress, making good conversation, answering appropriately   SKIN: No pa

## 2021-01-28 ENCOUNTER — HOSPITAL ENCOUNTER (OUTPATIENT)
Age: 51
Discharge: HOME | End: 2021-01-28
Payer: COMMERCIAL

## 2021-01-28 VITALS
SYSTOLIC BLOOD PRESSURE: 143 MMHG | TEMPERATURE: 97 F | HEART RATE: 84 BPM | DIASTOLIC BLOOD PRESSURE: 79 MMHG | OXYGEN SATURATION: 97 % | RESPIRATION RATE: 18 BRPM

## 2021-01-28 DIAGNOSIS — R11.2 NAUSEA AND VOMITING, INTRACTABILITY OF VOMITING NOT SPECIFIED, UNSPECIFIED VOMITING TYPE: Primary | ICD-10-CM

## 2021-01-28 LAB — SARS-COV-2 RNA RESP QL NAA+PROBE: NOT DETECTED

## 2021-01-28 PROCEDURE — 99212 OFFICE O/P EST SF 10 MIN: CPT | Performed by: NURSE PRACTITIONER

## 2021-01-28 PROCEDURE — 99213 OFFICE O/P EST LOW 20 MIN: CPT | Performed by: NURSE PRACTITIONER

## 2021-01-28 NOTE — ED PROVIDER NOTES
Patient Seen in: [facility]      History   Patient presents with:  Vomiting    Stated Complaint: vomiting    HPI/Subjective:   HPI  39-year-old female presents to care requesting Covid testing.   She states yesterday she had [employer] nelly °C)   Resp 18   LMP 12/01/2011   SpO2 97%         Physical Exam  Vitals signs and nursing note reviewed. Constitutional:       General: She is not in acute distress. Appearance: Normal appearance. She is well-developed.  She is not ill-appearing or to

## 2021-01-28 NOTE — ED INITIAL ASSESSMENT (HPI)
C/o vomited twice after eating hamburger and onion rings at [employer] last night with headaches feeling hot and cold and nausea. This morning feels much better.

## 2021-03-31 ENCOUNTER — TELEPHONE (OUTPATIENT)
Dept: INTERNAL MEDICINE CLINIC | Facility: CLINIC | Age: 51
End: 2021-03-31

## 2021-03-31 DIAGNOSIS — Z12.31 ENCOUNTER FOR SCREENING MAMMOGRAM FOR MALIGNANT NEOPLASM OF BREAST: ICD-10-CM

## 2021-03-31 DIAGNOSIS — Z00.00 PREVENTATIVE HEALTH CARE: ICD-10-CM

## 2021-03-31 DIAGNOSIS — E55.9 VITAMIN D DEFICIENCY: ICD-10-CM

## 2021-03-31 DIAGNOSIS — Z12.11 SCREENING FOR MALIGNANT NEOPLASM OF COLON: Primary | ICD-10-CM

## 2021-03-31 DIAGNOSIS — E78.00 HYPERCHOLESTEREMIA: ICD-10-CM

## 2021-03-31 NOTE — TELEPHONE ENCOUNTER
Patient made aware of all orders/referral. Provided phone number to schedule with Dr. Ronnie Carmona as well as central scheduling for labs/mammogram. Patient verbalized understanding.

## 2021-03-31 NOTE — TELEPHONE ENCOUNTER
Patient sent a mychart requesting an order for blood work, colonoscopy and a mammogram. Patient needs to come in for CPX?      LOV: 1/31/20

## 2021-03-31 NOTE — TELEPHONE ENCOUNTER
She is scheduled for a physical with me next week - can keep that appointment.   Blood work, mammogram, GI referral for colonoscopy (Dr. Loco Waller) entered - if she can get blood work done at least 1-2 days before her physical we will have the res

## 2021-04-28 ENCOUNTER — LAB ENCOUNTER (OUTPATIENT)
Dept: LAB | Age: 51
End: 2021-04-28
Attending: INTERNAL MEDICINE
Payer: COMMERCIAL

## 2021-04-28 DIAGNOSIS — E55.9 VITAMIN D DEFICIENCY: ICD-10-CM

## 2021-04-28 DIAGNOSIS — Z00.00 PREVENTATIVE HEALTH CARE: ICD-10-CM

## 2021-04-28 DIAGNOSIS — E78.00 HYPERCHOLESTEREMIA: ICD-10-CM

## 2021-04-28 PROCEDURE — 81001 URINALYSIS AUTO W/SCOPE: CPT | Performed by: INTERNAL MEDICINE

## 2021-04-28 PROCEDURE — 80061 LIPID PANEL: CPT | Performed by: INTERNAL MEDICINE

## 2021-04-28 PROCEDURE — 82306 VITAMIN D 25 HYDROXY: CPT | Performed by: INTERNAL MEDICINE

## 2021-04-28 PROCEDURE — 83036 HEMOGLOBIN GLYCOSYLATED A1C: CPT | Performed by: INTERNAL MEDICINE

## 2021-04-28 PROCEDURE — 80050 GENERAL HEALTH PANEL: CPT | Performed by: INTERNAL MEDICINE

## 2021-04-30 DIAGNOSIS — E55.9 VITAMIN D DEFICIENCY: Primary | ICD-10-CM

## 2021-04-30 DIAGNOSIS — R31.29 MICROSCOPIC HEMATURIA: ICD-10-CM

## 2021-04-30 RX ORDER — ERGOCALCIFEROL 1.25 MG/1
50000 CAPSULE ORAL WEEKLY
Qty: 12 CAPSULE | Refills: 0 | Status: SHIPPED | OUTPATIENT
Start: 2021-04-30

## 2021-05-04 ENCOUNTER — HOSPITAL ENCOUNTER (OUTPATIENT)
Dept: MAMMOGRAPHY | Age: 51
Discharge: HOME | End: 2021-05-04
Attending: INTERNAL MEDICINE
Payer: COMMERCIAL

## 2021-05-04 DIAGNOSIS — Z12.31 ENCOUNTER FOR SCREENING MAMMOGRAM FOR MALIGNANT NEOPLASM OF BREAST: ICD-10-CM

## 2021-05-04 PROCEDURE — 77067 SCR MAMMO BI INCL CAD: CPT | Performed by: INTERNAL MEDICINE

## 2021-05-04 PROCEDURE — 77063 BREAST TOMOSYNTHESIS BI: CPT | Performed by: INTERNAL MEDICINE

## 2021-08-04 DIAGNOSIS — E55.9 VITAMIN D DEFICIENCY: ICD-10-CM

## 2021-08-04 RX ORDER — ERGOCALCIFEROL 1.25 MG/1
CAPSULE ORAL
Qty: 12 CAPSULE | Refills: 0 | OUTPATIENT
Start: 2021-08-04

## 2021-08-04 NOTE — TELEPHONE ENCOUNTER
LOV: 1/31/2020 with Dr. Jessica Asif  RTC: 3-6 months  Last Relevant Labs: 4/28/2021   Filled: 4/30/2021   #12 with 0 refills    Please review lab results 4/28/2021    No future appointments.

## 2021-10-25 NOTE — LETTER
Date & Time: 1/22/2025, 11:34 AM  Patient: Wendy Greene  Encounter Provider(s):    Emmanuel Foss MD       To Whom It May Concern:    Wendy Greene was seen and treated in our department on 1/22/2025. She should not return to work until 1/23 .    If you have any questions or concerns, please do not hesitate to call.        _____________________________  Physician/APC Signature            TOP X1 with D&C

## 2022-01-25 ENCOUNTER — LAB ENCOUNTER (OUTPATIENT)
Dept: LAB | Age: 52
End: 2022-01-25
Attending: INTERNAL MEDICINE
Payer: COMMERCIAL

## 2022-01-25 DIAGNOSIS — Z01.818 PRE-OP TESTING: ICD-10-CM

## 2022-01-26 DIAGNOSIS — E55.9 VITAMIN D DEFICIENCY: ICD-10-CM

## 2022-01-26 LAB — SARS-COV-2 RNA RESP QL NAA+PROBE: NOT DETECTED

## 2022-01-26 RX ORDER — ERGOCALCIFEROL 1.25 MG/1
50000 CAPSULE ORAL WEEKLY
Qty: 12 CAPSULE | Refills: 0 | OUTPATIENT
Start: 2022-01-26

## 2022-01-26 NOTE — TELEPHONE ENCOUNTER
Sent pt mycPangot message stating she is due for Px and labs. Ergocalciferol   Filled 4-30-21  Qty 12  0 refills  No upcoming appt.   LOV 1-31-20  Labs: 4-28-21

## 2022-01-28 PROBLEM — Z12.11 SPECIAL SCREENING FOR MALIGNANT NEOPLASM OF COLON: Status: ACTIVE | Noted: 2022-01-28

## 2022-01-28 PROBLEM — Z80.0 FAMILY HISTORY OF COLON CANCER: Status: ACTIVE | Noted: 2022-01-28

## 2022-04-05 ENCOUNTER — PATIENT MESSAGE (OUTPATIENT)
Dept: INTERNAL MEDICINE CLINIC | Facility: CLINIC | Age: 52
End: 2022-04-05

## 2022-04-05 NOTE — TELEPHONE ENCOUNTER
From: Juani Ramirez  To: Kaylyn Tineo MD  Sent: 4/5/2022 2:38 PM CDT  Subject: Tests    I need an order for my annual mammogram and blood work

## 2022-04-18 ENCOUNTER — LAB ENCOUNTER (OUTPATIENT)
Dept: LAB | Age: 52
End: 2022-04-18
Attending: INTERNAL MEDICINE
Payer: COMMERCIAL

## 2022-04-18 DIAGNOSIS — Z80.0 FAMILY HISTORY OF MALIGNANT NEOPLASM OF COLON: ICD-10-CM

## 2022-04-18 DIAGNOSIS — R73.03 PREDIABETES: ICD-10-CM

## 2022-04-18 DIAGNOSIS — R31.29 MICROSCOPIC HEMATURIA: ICD-10-CM

## 2022-04-18 DIAGNOSIS — E55.9 VITAMIN D DEFICIENCY: ICD-10-CM

## 2022-04-18 DIAGNOSIS — E78.00 HYPERCHOLESTEREMIA: ICD-10-CM

## 2022-04-18 DIAGNOSIS — Z00.00 PREVENTATIVE HEALTH CARE: ICD-10-CM

## 2022-04-18 LAB
ALBUMIN SERPL-MCNC: 3.8 G/DL (ref 3.4–5)
ALBUMIN/GLOB SERPL: 1 {RATIO} (ref 1–2)
ALP LIVER SERPL-CCNC: 87 U/L
ALT SERPL-CCNC: 42 U/L
ANION GAP SERPL CALC-SCNC: 5 MMOL/L (ref 0–18)
AST SERPL-CCNC: 23 U/L (ref 15–37)
BASOPHILS # BLD AUTO: 0.05 X10(3) UL (ref 0–0.2)
BASOPHILS NFR BLD AUTO: 0.7 %
BILIRUB SERPL-MCNC: 0.3 MG/DL (ref 0.1–2)
BILIRUB UR QL STRIP.AUTO: NEGATIVE
BUN BLD-MCNC: 12 MG/DL (ref 7–18)
CALCIUM BLD-MCNC: 9.5 MG/DL (ref 8.5–10.1)
CHLORIDE SERPL-SCNC: 107 MMOL/L (ref 98–112)
CHOLEST SERPL-MCNC: 239 MG/DL (ref ?–200)
CO2 SERPL-SCNC: 30 MMOL/L (ref 21–32)
COLOR UR AUTO: YELLOW
CREAT BLD-MCNC: 0.86 MG/DL
EOSINOPHIL # BLD AUTO: 0.1 X10(3) UL (ref 0–0.7)
EOSINOPHIL NFR BLD AUTO: 1.4 %
ERYTHROCYTE [DISTWIDTH] IN BLOOD BY AUTOMATED COUNT: 14.7 %
EST. AVERAGE GLUCOSE BLD GHB EST-MCNC: 120 MG/DL (ref 68–126)
FASTING PATIENT LIPID ANSWER: YES
FASTING STATUS PATIENT QL REPORTED: YES
GLOBULIN PLAS-MCNC: 3.9 G/DL (ref 2.8–4.4)
GLUCOSE BLD-MCNC: 81 MG/DL (ref 70–99)
GLUCOSE UR STRIP.AUTO-MCNC: NEGATIVE MG/DL
HBA1C MFR BLD: 5.8 % (ref ?–5.7)
HCT VFR BLD AUTO: 45.1 %
HDLC SERPL-MCNC: 49 MG/DL (ref 40–59)
HGB BLD-MCNC: 13.8 G/DL
IMM GRANULOCYTES # BLD AUTO: 0.02 X10(3) UL (ref 0–1)
IMM GRANULOCYTES NFR BLD: 0.3 %
KETONES UR STRIP.AUTO-MCNC: NEGATIVE MG/DL
LDLC SERPL CALC-MCNC: 169 MG/DL (ref ?–100)
LEUKOCYTE ESTERASE UR QL STRIP.AUTO: NEGATIVE
LYMPHOCYTES # BLD AUTO: 2.49 X10(3) UL (ref 1–4)
LYMPHOCYTES NFR BLD AUTO: 34.8 %
MCH RBC QN AUTO: 28.5 PG (ref 26–34)
MCHC RBC AUTO-ENTMCNC: 30.6 G/DL (ref 31–37)
MCV RBC AUTO: 93.2 FL
MONOCYTES # BLD AUTO: 0.41 X10(3) UL (ref 0.1–1)
MONOCYTES NFR BLD AUTO: 5.7 %
NEUTROPHILS # BLD AUTO: 4.08 X10 (3) UL (ref 1.5–7.7)
NEUTROPHILS # BLD AUTO: 4.08 X10(3) UL (ref 1.5–7.7)
NEUTROPHILS NFR BLD AUTO: 57.1 %
NITRITE UR QL STRIP.AUTO: NEGATIVE
NONHDLC SERPL-MCNC: 190 MG/DL (ref ?–130)
OSMOLALITY SERPL CALC.SUM OF ELEC: 293 MOSM/KG (ref 275–295)
PH UR STRIP.AUTO: 6 [PH] (ref 5–8)
PLATELET # BLD AUTO: 252 10(3)UL (ref 150–450)
POTASSIUM SERPL-SCNC: 4.3 MMOL/L (ref 3.5–5.1)
PROT SERPL-MCNC: 7.7 G/DL (ref 6.4–8.2)
PROT UR STRIP.AUTO-MCNC: NEGATIVE MG/DL
RBC # BLD AUTO: 4.84 X10(6)UL
SODIUM SERPL-SCNC: 142 MMOL/L (ref 136–145)
SP GR UR STRIP.AUTO: 1.02 (ref 1–1.03)
TRIGL SERPL-MCNC: 115 MG/DL (ref 30–149)
TSI SER-ACNC: 1.63 MIU/ML (ref 0.36–3.74)
UROBILINOGEN UR STRIP.AUTO-MCNC: <2 MG/DL
VIT D+METAB SERPL-MCNC: 25.8 NG/ML (ref 30–100)
VLDLC SERPL CALC-MCNC: 23 MG/DL (ref 0–30)
WBC # BLD AUTO: 7.2 X10(3) UL (ref 4–11)

## 2022-04-18 PROCEDURE — 81001 URINALYSIS AUTO W/SCOPE: CPT | Performed by: INTERNAL MEDICINE

## 2022-04-18 PROCEDURE — 83036 HEMOGLOBIN GLYCOSYLATED A1C: CPT | Performed by: INTERNAL MEDICINE

## 2022-04-18 PROCEDURE — 82306 VITAMIN D 25 HYDROXY: CPT | Performed by: INTERNAL MEDICINE

## 2022-04-18 PROCEDURE — 80061 LIPID PANEL: CPT | Performed by: INTERNAL MEDICINE

## 2022-04-18 PROCEDURE — 80050 GENERAL HEALTH PANEL: CPT | Performed by: INTERNAL MEDICINE

## 2022-05-02 ENCOUNTER — LAB ENCOUNTER (OUTPATIENT)
Dept: LAB | Age: 52
End: 2022-05-02
Attending: INTERNAL MEDICINE
Payer: COMMERCIAL

## 2022-05-02 ENCOUNTER — TELEMEDICINE (OUTPATIENT)
Dept: INTERNAL MEDICINE CLINIC | Facility: CLINIC | Age: 52
End: 2022-05-02
Payer: COMMERCIAL

## 2022-05-02 DIAGNOSIS — N95.1 MENOPAUSAL SYMPTOMS: Primary | ICD-10-CM

## 2022-05-02 DIAGNOSIS — E55.9 VITAMIN D DEFICIENCY: ICD-10-CM

## 2022-05-02 DIAGNOSIS — E78.00 HYPERCHOLESTEREMIA: ICD-10-CM

## 2022-05-02 DIAGNOSIS — R31.29 MICROSCOPIC HEMATURIA: ICD-10-CM

## 2022-05-02 DIAGNOSIS — N95.1 MENOPAUSAL SYMPTOMS: ICD-10-CM

## 2022-05-02 DIAGNOSIS — R73.03 PREDIABETES: ICD-10-CM

## 2022-05-02 PROCEDURE — 82670 ASSAY OF TOTAL ESTRADIOL: CPT

## 2022-05-02 PROCEDURE — 84144 ASSAY OF PROGESTERONE: CPT

## 2022-05-02 PROCEDURE — 36415 COLL VENOUS BLD VENIPUNCTURE: CPT

## 2022-05-02 PROCEDURE — 83001 ASSAY OF GONADOTROPIN (FSH): CPT

## 2022-05-02 PROCEDURE — 83002 ASSAY OF GONADOTROPIN (LH): CPT

## 2022-05-02 RX ORDER — ERGOCALCIFEROL 1.25 MG/1
50000 CAPSULE ORAL WEEKLY
Qty: 12 CAPSULE | Refills: 0 | Status: SHIPPED | OUTPATIENT
Start: 2022-05-02

## 2022-05-02 NOTE — PROGRESS NOTES
Mai Leach is a 46year old female here today for a telemedicine/video visit. Patient is in the state of [place] during this visit. Mai Leach verbally consents to a Video visit service on 05/02/22. Patient understands and accepts financial responsibility for any deductible, co-insurance and/or co-pays associated with this service. Duration of the service: 8 minutes  Start time: 11:03 AM  End time: 11:11 AM    HPI:  Patient presents to discuss several symptoms. She has been dealing with vaginal dryness, night sweats, fatigue. Night sweats have been going on for 1-2 months. Vaginal dryness, fatigue has been going on for much longer. Occasional hot flashes. She had a total hysterectomy in 2011. Other issues:  Vitamin D deficiency - not on supplementation. Hypercholesterolemia, prediabetes - lifestyle controlled. Microscopic hematuria - chronic issue. No gross hematuria. Past medical, surgical, family, and social histories were reviewed and    ROS:  GENERAL: fatigue, night sweats  SKIN: denies any unusual skin lesions  EYES: denies blurred vision or double vision  HEENT: denies nasal congestion, sinus pain or sinus tenderness  LUNGS: denies shortness of breath with exertion  CARDIOVASCULAR: denies chest pain on exertion  GI: denies abdominal pain, denies heartburn, denies diarrhea  : vaginal dryness  MUSCULOSKELETAL: denies back pain, denies body aches  NEURO: denies headaches    EXAM:  GENERAL: Alert and oriented, well developed, well nourished,in no apparent distress  SKIN: no rashes,no suspicious lesions of face  HEENT: atraumatic, EOMI, normal lid and conjunctiva  NECK: no grossly visible jvd or thyromegaly  LUNGS: speaking in full sentences, no increased work of breathing, no audible wheezing  NEURO: alert and oriented x 3  PSYCH: pleasant, appropriate mood and affect    ASSESSMENT/PLAN:  1.  Menopausal symptoms  Patient with menopausal symptoms - vaginal dryness, night sweats, fatigue. Rare hot flashes. Will check hormonal labs. Will likely have patient follow up with gynecology based on results. Dr. Graciela Mejia assisted on her hysterectomy back in 2011, she has not really seen gyne since then. - [address]; Future  - LH (LUTEINIZING HORMONE); Future  - PROGESTERONE; Future  - ESTRADIOL; Future    2. Hypercholesteremia  3. Prediabetes  Lifestyle controlled. Continue focus on diet/exercise/weight loss. 4. Microscopic hematuria  Chronic issue, going back to at least 2015 if not earlier. Continue to monitor for now. 5. Vitamin D deficiency  Prescription vitamin D sent in - recommend OTC vitamin D in 3 months after prescription finishes. - ergocalciferol 1.25 MG (65757 UT) Oral Cap; Take 1 capsule (50,000 Units total) by mouth once a week. Dispense: 12 capsule; Refill: 0    Return to clinic in 12 months for follow up on chronic issues, or earlier as needed for acute issues. Please note that the following visit was completed using two-way, real-time interactive audio and video communication. This has been done in good teressa to provide continuity of care in the best interest of the provider-patient relationship, due to the ongoing public health crisis/national emergency and because of restrictions of visitation. There are limitations of this visit as a complete head to toe physical exam could not be performed. Every conscious effort was taken to allow for sufficient and adequate time. This billing was spent on reviewing labs, medications, radiology tests and decision making. Appropriate medical decision-making and tests are ordered as detailed in the plan of care above.

## 2022-05-03 LAB
ESTRADIOL SERPL-MCNC: 24.1 PG/ML
FSH SERPL-ACNC: 41 MIU/ML
LH SERPL-ACNC: 28.3 MIU/ML
PROGEST SERPL-MCNC: 0.56 NG/ML

## 2022-05-05 ENCOUNTER — OFFICE VISIT (OUTPATIENT)
Dept: OBGYN CLINIC | Facility: CLINIC | Age: 52
End: 2022-05-05
Payer: COMMERCIAL

## 2022-05-05 VITALS
HEIGHT: 66 IN | DIASTOLIC BLOOD PRESSURE: 80 MMHG | HEART RATE: 83 BPM | BODY MASS INDEX: 43.96 KG/M2 | WEIGHT: 273.5 LBS | SYSTOLIC BLOOD PRESSURE: 120 MMHG

## 2022-05-05 DIAGNOSIS — E66.01 MORBID OBESITY WITH BMI OF 40.0-44.9, ADULT (HCC): Primary | ICD-10-CM

## 2022-05-05 DIAGNOSIS — Z78.0 MENOPAUSE: ICD-10-CM

## 2022-05-05 DIAGNOSIS — N95.2 VAGINAL ATROPHY: ICD-10-CM

## 2022-05-05 PROCEDURE — 3079F DIAST BP 80-89 MM HG: CPT | Performed by: NURSE PRACTITIONER

## 2022-05-05 PROCEDURE — 3008F BODY MASS INDEX DOCD: CPT | Performed by: NURSE PRACTITIONER

## 2022-05-05 PROCEDURE — 3074F SYST BP LT 130 MM HG: CPT | Performed by: NURSE PRACTITIONER

## 2022-05-05 PROCEDURE — 99203 OFFICE O/P NEW LOW 30 MIN: CPT | Performed by: NURSE PRACTITIONER

## 2022-05-05 RX ORDER — ESTRADIOL 0.1 MG/G
1 CREAM VAGINAL
Qty: 42.5 G | Refills: 1 | Status: SHIPPED | OUTPATIENT
Start: 2022-05-05

## 2022-05-11 ENCOUNTER — HOSPITAL ENCOUNTER (OUTPATIENT)
Age: 52
Discharge: HOME | End: 2022-05-11
Attending: EMERGENCY MEDICINE
Payer: COMMERCIAL

## 2022-05-11 VITALS
SYSTOLIC BLOOD PRESSURE: 129 MMHG | TEMPERATURE: 101 F | DIASTOLIC BLOOD PRESSURE: 79 MMHG | OXYGEN SATURATION: 96 % | HEART RATE: 90 BPM | RESPIRATION RATE: 16 BRPM

## 2022-05-11 DIAGNOSIS — B34.9 VIRAL SYNDROME: Primary | ICD-10-CM

## 2022-05-11 LAB — SARS-COV-2 RNA RESP QL NAA+PROBE: NOT DETECTED

## 2022-05-11 PROCEDURE — 99213 OFFICE O/P EST LOW 20 MIN: CPT

## 2022-05-11 PROCEDURE — 99212 OFFICE O/P EST SF 10 MIN: CPT

## 2022-05-11 RX ORDER — ACETAMINOPHEN 500 MG
1000 TABLET ORAL ONCE
Status: COMPLETED | OUTPATIENT
Start: 2022-05-11 | End: 2022-05-11

## 2022-05-11 NOTE — ED INITIAL ASSESSMENT (HPI)
Pt here c/o bodyaches, nausea, diarrhea, chest tightness, shortness of breath, h/a, fever. Onset 2 days ago.

## 2022-06-10 ENCOUNTER — HOSPITAL ENCOUNTER (OUTPATIENT)
Dept: MAMMOGRAPHY | Age: 52
Discharge: HOME | End: 2022-06-10
Attending: INTERNAL MEDICINE
Payer: COMMERCIAL

## 2022-06-10 ENCOUNTER — TELEPHONE (OUTPATIENT)
Dept: INTERNAL MEDICINE CLINIC | Facility: CLINIC | Age: 52
End: 2022-06-10

## 2022-06-10 ENCOUNTER — OFFICE VISIT (OUTPATIENT)
Dept: INTERNAL MEDICINE CLINIC | Facility: CLINIC | Age: 52
End: 2022-06-10
Payer: COMMERCIAL

## 2022-06-10 VITALS
WEIGHT: 271.81 LBS | RESPIRATION RATE: 16 BRPM | SYSTOLIC BLOOD PRESSURE: 114 MMHG | TEMPERATURE: 98 F | BODY MASS INDEX: 43.68 KG/M2 | HEART RATE: 72 BPM | DIASTOLIC BLOOD PRESSURE: 78 MMHG | HEIGHT: 66 IN

## 2022-06-10 DIAGNOSIS — Z00.00 ENCOUNTER FOR PREVENTATIVE ADULT HEALTH CARE EXAMINATION: Primary | ICD-10-CM

## 2022-06-10 DIAGNOSIS — R73.03 PREDIABETES: ICD-10-CM

## 2022-06-10 DIAGNOSIS — Z23 NEED FOR SHINGLES VACCINE: Primary | ICD-10-CM

## 2022-06-10 DIAGNOSIS — Z23 NEED FOR SHINGLES VACCINE: ICD-10-CM

## 2022-06-10 DIAGNOSIS — Z23 NEED FOR DIPHTHERIA-TETANUS-PERTUSSIS (TDAP) VACCINE: ICD-10-CM

## 2022-06-10 DIAGNOSIS — Z12.31 ENCOUNTER FOR SCREENING MAMMOGRAM FOR MALIGNANT NEOPLASM OF BREAST: ICD-10-CM

## 2022-06-10 DIAGNOSIS — K64.4 EXTERNAL HEMORRHOID: ICD-10-CM

## 2022-06-10 DIAGNOSIS — E78.00 HYPERCHOLESTEREMIA: ICD-10-CM

## 2022-06-10 PROCEDURE — 90472 IMMUNIZATION ADMIN EACH ADD: CPT | Performed by: INTERNAL MEDICINE

## 2022-06-10 PROCEDURE — 90471 IMMUNIZATION ADMIN: CPT | Performed by: INTERNAL MEDICINE

## 2022-06-10 PROCEDURE — 3074F SYST BP LT 130 MM HG: CPT | Performed by: INTERNAL MEDICINE

## 2022-06-10 PROCEDURE — 77067 SCR MAMMO BI INCL CAD: CPT | Performed by: INTERNAL MEDICINE

## 2022-06-10 PROCEDURE — 99396 PREV VISIT EST AGE 40-64: CPT | Performed by: INTERNAL MEDICINE

## 2022-06-10 PROCEDURE — 77063 BREAST TOMOSYNTHESIS BI: CPT | Performed by: INTERNAL MEDICINE

## 2022-06-10 PROCEDURE — 90750 HZV VACC RECOMBINANT IM: CPT | Performed by: INTERNAL MEDICINE

## 2022-06-10 PROCEDURE — 3078F DIAST BP <80 MM HG: CPT | Performed by: INTERNAL MEDICINE

## 2022-06-10 PROCEDURE — 3008F BODY MASS INDEX DOCD: CPT | Performed by: INTERNAL MEDICINE

## 2022-06-10 PROCEDURE — 90715 TDAP VACCINE 7 YRS/> IM: CPT | Performed by: INTERNAL MEDICINE

## 2022-06-10 RX ORDER — HYDROCORTISONE 25 MG/G
1 CREAM TOPICAL 2 TIMES DAILY
Qty: 28 G | Refills: 1 | Status: SHIPPED | OUTPATIENT
Start: 2022-06-10

## 2022-06-10 NOTE — PATIENT INSTRUCTIONS
- Get heart scan done to look at calcium build up in heart arteries. If there is build up, we may start you on a cholesterol medication.  - Focus on healthy diet and weight loss for the A1c/prediabetes  - Start prescription hydrocortisone cream for your hemorrhoid. Apply to rectal area twice daily for next 2-3 weeks. If pain, bleeding is not better by then, follow up with our general surgeons:  Dr. Claudia Drake, [facility]  [address] [facility] [address] [facility], [address]  [phone]    [address] [address]  [phone]    - Follow up with [facility] JOHNSON for hearing issues:  [address] [facility]Fourth [facility] [address]  [place], [address]  [phone]    [address] [facility], [facility]  [phone]    It was a pleasure seeing you in the clinic today. Thank you for choosing the [facility] office for your healthcare needs. Please call at [phone] with any questions or concerns.     Kamar Bingham MD

## 2022-08-30 ENCOUNTER — OFFICE VISIT (OUTPATIENT)
Dept: INTERNAL MEDICINE CLINIC | Facility: CLINIC | Age: 52
End: 2022-08-30
Payer: COMMERCIAL

## 2022-08-30 VITALS
SYSTOLIC BLOOD PRESSURE: 122 MMHG | BODY MASS INDEX: 43.23 KG/M2 | WEIGHT: 269 LBS | HEIGHT: 66 IN | HEART RATE: 82 BPM | RESPIRATION RATE: 16 BRPM | DIASTOLIC BLOOD PRESSURE: 84 MMHG

## 2022-08-30 DIAGNOSIS — R06.83 SNORING: ICD-10-CM

## 2022-08-30 DIAGNOSIS — R73.03 PREDIABETES: ICD-10-CM

## 2022-08-30 DIAGNOSIS — Z51.81 ENCOUNTER FOR THERAPEUTIC DRUG MONITORING: Primary | ICD-10-CM

## 2022-08-30 DIAGNOSIS — E66.01 OBESITY, MORBID, BMI 40.0-49.9 (HCC): ICD-10-CM

## 2022-08-30 DIAGNOSIS — E78.00 HYPERCHOLESTEREMIA: ICD-10-CM

## 2022-08-30 DIAGNOSIS — E55.9 VITAMIN D DEFICIENCY: ICD-10-CM

## 2022-08-30 PROCEDURE — 3008F BODY MASS INDEX DOCD: CPT | Performed by: NURSE PRACTITIONER

## 2022-08-30 PROCEDURE — 3079F DIAST BP 80-89 MM HG: CPT | Performed by: NURSE PRACTITIONER

## 2022-08-30 PROCEDURE — 3074F SYST BP LT 130 MM HG: CPT | Performed by: NURSE PRACTITIONER

## 2022-08-30 PROCEDURE — 99214 OFFICE O/P EST MOD 30 MIN: CPT | Performed by: NURSE PRACTITIONER

## 2022-08-30 RX ORDER — ORAL SEMAGLUTIDE 3 MG/1
TABLET ORAL
COMMUNITY

## 2022-08-30 NOTE — PATIENT INSTRUCTIONS
We are here to support you with weight loss, but please remember that you still need your primary care provider for your routine health maintenance. PLAN:  Will start rybelsus 3mg daily (make sure to take on an empty stomach and wait 30 minutes before eating or drinking anything)- send message in 3 weeks to tell us what dose you need of the rybelsus 3 or 7mg   Get blood work done  Can try out aqua therapy (for knee pain)   Follow up with me in 6 weeks  Schedule follow up appointments: Rodney Torre (dietitian) or Roshni Jung (presurgery dietitian)   Check for insurance coverage for dietitian and labwork prior to scheduling appointment. Please try to work on the following dietary changes:  1. Goals: Aim for 20-30 grams of protein/ meal  i. Aim for 140 grams of carbohydrates/day  ii. Eat 4-6 vegetables/day  iii. Avoid skipping meals- eat every 4-5 hours  iv. Aim for 3 meals/day  2. Drink lots of water and cut down on soda/juice consumption if soda/juice drinker  3. Focus on protein: (15-30 grams with each meal) ie. greek yogurt, cottage cheese, string cheese, hard boiled eggs  4. Healthy snacks: peanut butter and apples, hummus and carrots, berries, nuts (1/4 cup), tuna and crackers                 Protein Shakes: Premier protein or Core Power                Protein Bars: Rx Bars, Oatmega, Power Crunch                 Sargento balanced breaks (cheese and nuts)- without chocolate  5. Reduce carbohydrates which includes sweets as well as rice, pasta, potatoes, bread, corn and instead choose whole grain options or more protein or vegetables (4-6 servings of vegetables per day)  6. Get a good night of sleep  7. Try to decrease stress in life     Please download apps:  1.  \"My Fitness Pal\" (other option is Lose it)) to help you to monitor daily dietary intake and you will be able to see if you are eating the right amount of calories, protein, carbs                With My Fitness Pal-->When you set-up the garcía or need to adjust settings:                Goals should include: Lose 1.5-2 lbs per week                Activity level: not very active (can't count exercise towards calorie number per day)                   ** Daily INPUT> Look at nutrition section-- \"nutrients\" and it will break down your macros for the day (ie. Protein, carbs, fibers, sugars and fats). Try to stay within these numbers daily     2. \"7 minute workout\" to help with exercise/activity which takes 7 minutes of your day and that you can do at home! 3. \"Calm\" or \"Headspace\" which helps with mindfulness, meditation, clarity, sleep, and darwin to your daily life. 4. [employer] blog for healthy recipe ideas  5. [employer] for low carb resources    HIGH PROTEIN SNACK IDEAS  -cottage cheese  -plain yogurt  -kefir  -hard-boiled eggs  -natural cheeses  -nuts (measure portion size)   -unsweetened nut butters  -dried edamame   -joselin seeds soaked in water or almond milk  -soy nuts  -cured meats (monitor for sodium issues)   -hummus with vegetables  -bean dip with vegetables     FRUIT  Low carb fruit options   Raspberries: Half a cup (60 grams) contains 3 grams of carbs. Blackberries: Half a cup (70 grams) contains 4 grams of carbs. Strawberries: Half a cup (100 grams) contains 6 grams of carbs. Blueberries: Half a cup (50 grams) contains 6 grams of carbs. Plum: One medium-sized (80 grams) contains 6 grams of carbs.      VEGETABLES  Low carb vegetables

## 2022-09-15 ENCOUNTER — NURSE ONLY (OUTPATIENT)
Dept: INTERNAL MEDICINE CLINIC | Facility: CLINIC | Age: 52
End: 2022-09-15
Payer: COMMERCIAL

## 2022-09-15 PROCEDURE — 90471 IMMUNIZATION ADMIN: CPT | Performed by: INTERNAL MEDICINE

## 2022-09-15 PROCEDURE — 90750 HZV VACC RECOMBINANT IM: CPT | Performed by: INTERNAL MEDICINE

## 2022-10-06 ENCOUNTER — OFFICE VISIT (OUTPATIENT)
Dept: INTERNAL MEDICINE CLINIC | Facility: CLINIC | Age: 52
End: 2022-10-06
Payer: COMMERCIAL

## 2022-10-06 VITALS — WEIGHT: 267 LBS | BODY MASS INDEX: 42.91 KG/M2 | HEIGHT: 66 IN

## 2022-10-06 DIAGNOSIS — Z51.81 ENCOUNTER FOR THERAPEUTIC DRUG MONITORING: ICD-10-CM

## 2022-10-06 DIAGNOSIS — E78.00 HYPERCHOLESTEREMIA: Chronic | ICD-10-CM

## 2022-10-06 DIAGNOSIS — E66.01 OBESITY, MORBID, BMI 40.0-49.9 (HCC): ICD-10-CM

## 2022-10-06 DIAGNOSIS — R73.03 PREDIABETES: ICD-10-CM

## 2022-10-06 DIAGNOSIS — E55.9 VITAMIN D DEFICIENCY: ICD-10-CM

## 2022-10-06 NOTE — PATIENT INSTRUCTIONS
Goals: 1. Keep a food record, My Net Diary, select the macros dashboard or My Fitness Pal.  2.  Strive to consume at least 4-6 meals/snacks per day. Include protein with produce. Aim for 47-59 grams of protein per day. Try to keep the carbohydrates at 120 grams per day or less. 3.  Practice eating strategies, mindful eating, chew food 20-30 times before swallowing. Make the meals last 30 minutes. 4.  Aim for 64 oz per day of water. (Try Protein water, adding True Lemon, Crystal Light). 5.  Exercise and strength train with a goal of 30 minutes per day for exercise (for example,walking). Strength training 10 minutes 3 days per week. (7 minute workout).

## 2022-10-17 ENCOUNTER — OFFICE VISIT (OUTPATIENT)
Dept: INTERNAL MEDICINE CLINIC | Facility: CLINIC | Age: 52
End: 2022-10-17
Payer: COMMERCIAL

## 2022-10-17 VITALS
BODY MASS INDEX: 42.43 KG/M2 | SYSTOLIC BLOOD PRESSURE: 132 MMHG | RESPIRATION RATE: 16 BRPM | HEIGHT: 66 IN | WEIGHT: 264 LBS | HEART RATE: 60 BPM | DIASTOLIC BLOOD PRESSURE: 84 MMHG

## 2022-10-17 DIAGNOSIS — R06.83 SNORING: ICD-10-CM

## 2022-10-17 DIAGNOSIS — E78.00 HYPERCHOLESTEREMIA: ICD-10-CM

## 2022-10-17 DIAGNOSIS — R73.03 PREDIABETES: ICD-10-CM

## 2022-10-17 DIAGNOSIS — E66.01 OBESITY, MORBID, BMI 40.0-49.9 (HCC): ICD-10-CM

## 2022-10-17 DIAGNOSIS — K59.09 OTHER CONSTIPATION: ICD-10-CM

## 2022-10-17 DIAGNOSIS — E55.9 VITAMIN D DEFICIENCY: ICD-10-CM

## 2022-10-17 DIAGNOSIS — Z51.81 ENCOUNTER FOR THERAPEUTIC DRUG MONITORING: Primary | ICD-10-CM

## 2022-10-17 PROCEDURE — 3008F BODY MASS INDEX DOCD: CPT | Performed by: NURSE PRACTITIONER

## 2022-10-17 PROCEDURE — 3079F DIAST BP 80-89 MM HG: CPT | Performed by: NURSE PRACTITIONER

## 2022-10-17 PROCEDURE — 3075F SYST BP GE 130 - 139MM HG: CPT | Performed by: NURSE PRACTITIONER

## 2022-10-17 PROCEDURE — 99213 OFFICE O/P EST LOW 20 MIN: CPT | Performed by: NURSE PRACTITIONER

## 2022-10-17 RX ORDER — LINACLOTIDE 72 UG/1
72 CAPSULE, GELATIN COATED ORAL DAILY
Qty: 8 CAPSULE | Refills: 0 | COMMUNITY
Start: 2022-10-17 | End: 2022-11-16

## 2022-10-17 NOTE — PATIENT INSTRUCTIONS
We are here to support you with weight loss, but please remember that you still need your primary care provider for your routine health maintenance. PLAN:  Will restart rybelsus 3mg (will need to send in KartoonArt message in a couple of weeks for refill)   Add in miralax and stool softer and linzess as needed for constipation   Calories: 1650 per day, carbs <140g and protein 90g per day   Follow up with me in 6-7 weeks  Schedule follow up appointments: Xavier Koo (dietitian) or Russell Mukherjee (presurgery dietitian)   Check for insurance coverage for dietitian and labwork prior to scheduling appointment. Please try to work on the following dietary changes:  1. Goals: Aim for 20-30 grams of protein/ meal  i. Aim for 140 grams of carbohydrates/day  ii. Eat 4-6 vegetables/day  iii. Avoid skipping meals- eat every 4-5 hours  iv. Aim for 3 meals/day  2. Drink lots of water and cut down on soda/juice consumption if soda/juice drinker  3. Focus on protein: (15-30 grams with each meal) ie. greek yogurt, cottage cheese, string cheese, hard boiled eggs  4. Healthy snacks: peanut butter and apples, hummus and carrots, berries, nuts (1/4 cup), tuna and crackers                 Protein Shakes: Premier protein or Core Power                Protein Bars: Rx Bars, Oatmega, Power Crunch                 Sargento balanced breaks (cheese and nuts)- without chocolate  5. Reduce carbohydrates which includes sweets as well as rice, pasta, potatoes, bread, corn and instead choose whole grain options or more protein or vegetables (4-6 servings of vegetables per day)  6. Get a good night of sleep  7. Try to decrease stress in life     Please download apps:  1.  \"My Fitness Pal\" (other option is Lose it)) to help you to monitor daily dietary intake and you will be able to see if you are eating the right amount of calories, protein, carbs                With My Fitness Pal-->When you set-up the garcía or need to adjust settings: Goals should include: Lose 1.5-2 lbs per week                Activity level: not very active (can't count exercise towards calorie number per day)                   ** Daily INPUT> Look at nutrition section-- \"nutrients\" and it will break down your macros for the day (ie. Protein, carbs, fibers, sugars and fats). Try to stay within these numbers daily     2. \"7 minute workout\" to help with exercise/activity which takes 7 minutes of your day and that you can do at home! 3. \"Calm\" or \"Headspace\" which helps with mindfulness, meditation, clarity, sleep, and darwin to your daily life. 4. [employer] blog for healthy recipe ideas  5. [employer] for low carb resources    HIGH PROTEIN SNACK IDEAS  -cottage cheese  -plain yogurt  -kefir  -hard-boiled eggs  -natural cheeses  -nuts (measure portion size)   -unsweetened nut butters  -dried edamame   -joselin seeds soaked in water or almond milk  -soy nuts  -cured meats (monitor for sodium issues)   -hummus with vegetables  -bean dip with vegetables     FRUIT  Low carb fruit options   Raspberries: Half a cup (60 grams) contains 3 grams of carbs. Blackberries: Half a cup (70 grams) contains 4 grams of carbs. Strawberries: Half a cup (100 grams) contains 6 grams of carbs. Blueberries: Half a cup (50 grams) contains 6 grams of carbs. Plum: One medium-sized (80 grams) contains 6 grams of carbs.      VEGETABLES  Low carb vegetables

## 2023-04-11 ENCOUNTER — PATIENT MESSAGE (OUTPATIENT)
Dept: INTERNAL MEDICINE CLINIC | Facility: CLINIC | Age: 53
End: 2023-04-11

## 2023-04-11 DIAGNOSIS — Z12.31 ENCOUNTER FOR SCREENING MAMMOGRAM FOR MALIGNANT NEOPLASM OF BREAST: Primary | ICD-10-CM

## 2023-04-11 DIAGNOSIS — E55.9 VITAMIN D DEFICIENCY: ICD-10-CM

## 2023-04-11 DIAGNOSIS — R73.03 PREDIABETES: ICD-10-CM

## 2023-04-11 DIAGNOSIS — Z00.00 PREVENTATIVE HEALTH CARE: ICD-10-CM

## 2023-04-11 DIAGNOSIS — E78.00 HYPERCHOLESTEREMIA: Chronic | ICD-10-CM

## 2023-04-11 NOTE — TELEPHONE ENCOUNTER
From: Carrington Ganser  To: Adelia Warren MD  Sent: 4/11/2023 5:11 PM CDT  Subject: Orders     Good afternoon! I need orders for my yearly mammogram and blood work.

## 2023-05-08 ENCOUNTER — OFFICE VISIT (OUTPATIENT)
Dept: INTERNAL MEDICINE CLINIC | Facility: CLINIC | Age: 53
End: 2023-05-08
Payer: COMMERCIAL

## 2023-05-08 VITALS
WEIGHT: 267.38 LBS | RESPIRATION RATE: 16 BRPM | SYSTOLIC BLOOD PRESSURE: 126 MMHG | HEART RATE: 68 BPM | DIASTOLIC BLOOD PRESSURE: 88 MMHG | BODY MASS INDEX: 43 KG/M2 | TEMPERATURE: 98 F

## 2023-05-08 DIAGNOSIS — R51.9 BILATERAL HEADACHES: ICD-10-CM

## 2023-05-08 DIAGNOSIS — E78.00 HYPERCHOLESTEREMIA: Chronic | ICD-10-CM

## 2023-05-08 DIAGNOSIS — R11.0 NAUSEA: Primary | ICD-10-CM

## 2023-05-08 DIAGNOSIS — R73.03 PREDIABETES: ICD-10-CM

## 2023-05-08 DIAGNOSIS — R42 DIZZINESS: ICD-10-CM

## 2023-05-08 DIAGNOSIS — E66.01 OBESITY, CLASS III, BMI 40-49.9 (MORBID OBESITY) (HCC): ICD-10-CM

## 2023-05-08 PROCEDURE — 99214 OFFICE O/P EST MOD 30 MIN: CPT | Performed by: INTERNAL MEDICINE

## 2023-05-08 PROCEDURE — 3074F SYST BP LT 130 MM HG: CPT | Performed by: INTERNAL MEDICINE

## 2023-05-08 PROCEDURE — 3079F DIAST BP 80-89 MM HG: CPT | Performed by: INTERNAL MEDICINE

## 2023-05-08 RX ORDER — MECLIZINE HYDROCHLORIDE 25 MG/1
25 TABLET ORAL 3 TIMES DAILY PRN
Qty: 10 TABLET | Refills: 0 | Status: SHIPPED | OUTPATIENT
Start: 2023-05-08

## 2023-05-08 NOTE — PATIENT INSTRUCTIONS
- Keep an eye on your symptoms for now. - Use the meclizine medication (also available over the counter) if nausea/dizziness comes back  - Let us know if medication does not help or symptoms continue  - You can follow up with our bariatric surgeons to discuss weight loss surgery options:  Dr. [place] Dubois and Dr. Lucille Mcintosh  [address] [facility]  [phone]  (I believe they come to [facility] a couple of times a month as well). - Follow up with me in 3-6 months for physical/chronic issues; follow up earlier as needed. It was a pleasure seeing you in the clinic today. Thank you for choosing the [facility] office for your healthcare needs. Please call at [phone] with any questions or concerns.     Mike Randall MD

## 2023-06-16 ENCOUNTER — HOSPITAL ENCOUNTER (OUTPATIENT)
Dept: MAMMOGRAPHY | Age: 53
Discharge: HOME | End: 2023-06-16
Attending: INTERNAL MEDICINE
Payer: COMMERCIAL

## 2023-06-16 DIAGNOSIS — Z12.31 ENCOUNTER FOR SCREENING MAMMOGRAM FOR MALIGNANT NEOPLASM OF BREAST: ICD-10-CM

## 2023-06-16 PROCEDURE — 77063 BREAST TOMOSYNTHESIS BI: CPT | Performed by: INTERNAL MEDICINE

## 2023-06-16 PROCEDURE — 77067 SCR MAMMO BI INCL CAD: CPT | Performed by: INTERNAL MEDICINE

## 2023-09-04 ENCOUNTER — APPOINTMENT (OUTPATIENT)
Dept: GENERAL RADIOLOGY | Age: 53
End: 2023-09-04
Attending: EMERGENCY MEDICINE
Payer: COMMERCIAL

## 2023-09-04 ENCOUNTER — HOSPITAL ENCOUNTER (OUTPATIENT)
Age: 53
Discharge: HOME | End: 2023-09-04
Attending: EMERGENCY MEDICINE
Payer: COMMERCIAL

## 2023-09-04 ENCOUNTER — APPOINTMENT (OUTPATIENT)
Dept: CT IMAGING | Age: 53
End: 2023-09-04
Attending: EMERGENCY MEDICINE
Payer: COMMERCIAL

## 2023-09-04 VITALS
DIASTOLIC BLOOD PRESSURE: 76 MMHG | RESPIRATION RATE: 18 BRPM | BODY MASS INDEX: 40.18 KG/M2 | HEART RATE: 62 BPM | TEMPERATURE: 98 F | OXYGEN SATURATION: 97 % | HEIGHT: 66 IN | SYSTOLIC BLOOD PRESSURE: 125 MMHG | WEIGHT: 250 LBS

## 2023-09-04 DIAGNOSIS — M54.9 MUSCULOSKELETAL BACK PAIN: Primary | ICD-10-CM

## 2023-09-04 LAB
#MXD IC: 0.4 X10ˆ3/UL (ref 0.1–1)
BILIRUB UR QL STRIP: NEGATIVE
BUN BLD-MCNC: 14 MG/DL (ref 7–18)
CHLORIDE BLD-SCNC: 102 MMOL/L (ref 98–112)
CLARITY UR: CLEAR
CO2 BLD-SCNC: 29 MMOL/L (ref 21–32)
COLOR UR: YELLOW
CREAT BLD-MCNC: 0.9 MG/DL
DDIMER WHOLE BLOOD: <200 NG/ML DDU (ref ?–400)
EGFRCR SERPLBLD CKD-EPI 2021: 76 ML/MIN/1.73M2 (ref 60–?)
GLUCOSE BLD-MCNC: 81 MG/DL (ref 70–99)
GLUCOSE UR STRIP-MCNC: NEGATIVE MG/DL
HCT VFR BLD AUTO: 43.4 %
HCT VFR BLD CALC: 46 %
HGB BLD-MCNC: 13.7 G/DL
ISTAT IONIZED CALCIUM FOR CHEM 8: 1.2 MMOL/L (ref 1.12–1.32)
KETONES UR STRIP-MCNC: NEGATIVE MG/DL
LYMPHOCYTES # BLD AUTO: 2.8 X10ˆ3/UL (ref 1–4)
LYMPHOCYTES NFR BLD AUTO: 39.6 %
MCH RBC QN AUTO: 28.9 PG (ref 26–34)
MCHC RBC AUTO-ENTMCNC: 31.6 G/DL (ref 31–37)
MCV RBC AUTO: 91.6 FL (ref 80–100)
MIXED CELL %: 5.6 %
NEUTROPHILS # BLD AUTO: 3.9 X10ˆ3/UL (ref 1.5–7.7)
NEUTROPHILS NFR BLD AUTO: 54.8 %
NITRITE UR QL STRIP: NEGATIVE
PH UR STRIP: 7 [PH]
PLATELET # BLD AUTO: 290 X10ˆ3/UL (ref 150–450)
POTASSIUM BLD-SCNC: 4.1 MMOL/L (ref 3.6–5.1)
PROT UR STRIP-MCNC: NEGATIVE MG/DL
RBC # BLD AUTO: 4.74 X10ˆ6/UL
SODIUM BLD-SCNC: 141 MMOL/L (ref 136–145)
SP GR UR STRIP: 1.02
TROPONIN I BLD-MCNC: <0.02 NG/ML
UROBILINOGEN UR STRIP-ACNC: <2 MG/DL
WBC # BLD AUTO: 7.1 X10ˆ3/UL (ref 4–11)

## 2023-09-04 PROCEDURE — 71046 X-RAY EXAM CHEST 2 VIEWS: CPT | Performed by: EMERGENCY MEDICINE

## 2023-09-04 PROCEDURE — 85025 COMPLETE CBC W/AUTO DIFF WBC: CPT | Performed by: EMERGENCY MEDICINE

## 2023-09-04 PROCEDURE — 81002 URINALYSIS NONAUTO W/O SCOPE: CPT

## 2023-09-04 PROCEDURE — 80047 BASIC METABLC PNL IONIZED CA: CPT

## 2023-09-04 PROCEDURE — 74176 CT ABD & PELVIS W/O CONTRAST: CPT | Performed by: EMERGENCY MEDICINE

## 2023-09-04 PROCEDURE — 84484 ASSAY OF TROPONIN QUANT: CPT

## 2023-09-04 PROCEDURE — 85378 FIBRIN DEGRADE SEMIQUANT: CPT | Performed by: EMERGENCY MEDICINE

## 2023-09-04 PROCEDURE — 93005 ELECTROCARDIOGRAM TRACING: CPT

## 2023-09-04 RX ORDER — CYCLOBENZAPRINE HCL 10 MG
10 TABLET ORAL 3 TIMES DAILY PRN
Qty: 20 TABLET | Refills: 0 | Status: SHIPPED | OUTPATIENT
Start: 2023-09-04 | End: 2023-09-11

## 2023-09-04 RX ORDER — KETOROLAC TROMETHAMINE 30 MG/ML
30 INJECTION, SOLUTION INTRAMUSCULAR; INTRAVENOUS ONCE
Status: DISCONTINUED | OUTPATIENT
Start: 2023-09-04 | End: 2023-09-04

## 2023-09-04 RX ORDER — KETOROLAC TROMETHAMINE 30 MG/ML
30 INJECTION, SOLUTION INTRAMUSCULAR; INTRAVENOUS ONCE
Status: COMPLETED | OUTPATIENT
Start: 2023-09-04 | End: 2023-09-04

## 2023-09-04 NOTE — ED INITIAL ASSESSMENT (HPI)
Patient reports left lower flank pain started this morning. Patient reports chest pressure for a few days with headache. Patient also reports that she has pressure with her left arm. Patient also reports nausea.
actual

## 2023-09-04 NOTE — DISCHARGE INSTRUCTIONS
Tylenol or ibuprofen for milder pain. Take the Flexeril for more severe pain. Apply ice 20 minutes 4 times a day if no improvement switch to heat follow-up with your doctor over next 2 days return for worsening or changing symptoms.

## 2023-09-05 LAB
ATRIAL RATE: 72 BPM
P AXIS: 56 DEGREES
P-R INTERVAL: 198 MS
Q-T INTERVAL: 378 MS
QRS DURATION: 84 MS
QTC CALCULATION (BEZET): 413 MS
R AXIS: 51 DEGREES
T AXIS: 17 DEGREES
VENTRICULAR RATE: 72 BPM

## 2023-10-02 ENCOUNTER — E-VISIT (OUTPATIENT)
Dept: TELEHEALTH | Age: 53
End: 2023-10-02
Payer: COMMERCIAL

## 2023-10-02 DIAGNOSIS — M54.9 ACUTE BACK PAIN, UNSPECIFIED BACK LOCATION, UNSPECIFIED BACK PAIN LATERALITY: Primary | ICD-10-CM

## 2023-10-02 NOTE — PROGRESS NOTES
Yadi Arnold is a 45year old female. HPI:   See answers to questions above. Current Outpatient Medications   Medication Sig Dispense Refill    meclizine 25 MG Oral Tab Take 1 tablet (25 mg total) by mouth 3 (three) times daily as needed for Dizziness or Nausea. (Patient not taking: Reported on 2023) 10 tablet 0    estradiol 0.1 MG/GM Vaginal Cream Place 1 g vaginally twice a week.  (Patient not taking: Reported on 2023) 42.5 g 1      Past Medical History:   Diagnosis Date    Arthritis     Knee    Back pain     Constipation     Diarrhea, unspecified     Flatulence/gas pain/belching     Heavy menses     Hysterectomy    Hemorrhoids     Irregular bowel habits     Menses painful     Hysterectomy    Wears glasses       Past Surgical History:   Procedure Laterality Date    BSO, OMENTECTOMY W/SIGIFREDO      COLONOSCOPY  2012    diverticuli, int hemorrhoids, repeat in 8 years - Dr. Shalini Heck    COLONOSCOPY  2017    int hemorrhoids - repeat 3 years d/t family hx - Dr. Steve Coles  Dec 2011    total with BSO (Dr Bhavya Hummel)      10/17/1986    OTHER SURGICAL HISTORY  2012    EGD - Dr. Shalini Heck - mild gastritis, gastric atrophy    TOTAL ABDOM HYSTERECTOMY        Family History   Problem Relation Age of Onset    Diabetes Father     Glaucoma Father     Hypertension Father     Cancer Father 61        colon CA    Other (Other) Father     Prostate Cancer Father         77    Colon Polyps Father     Hypertension Mother     Other (Other) Mother     Breast Cancer Sister 43    Cancer Paternal Aunt 48        multiple aunts with colon CA    Breast Cancer Paternal Aunt 39    Dementia Maternal Grandfather     Cancer Paternal Grandmother     Other (colonic mass) Other         fam hx    Hypertension Brother     Diabetes Brother     Obesity Other         fam hx    Obesity Brother     Cancer Sister 43        Breast    Other (OA) Sister     Breast Cancer Paternal Aunt     Breast Cancer Paternal Aunt     Colon Cancer Paternal Aunt         48    Dementia Maternal Grandmother       Social History:  Social History     Socioeconomic History    Marital status: Single    Number of children: 1   Occupational History    Occupation: principal   Tobacco Use    Smoking status: Never    Smokeless tobacco: Never   Vaping Use    Vaping Use: Never used   Substance and Sexual Activity    Alcohol use: Yes     Comment: 1-2 x per month     Drug use: No    Sexual activity: Yes     Partners: Male     Birth control/protection: Condom   Other Topics Concern    Caffeine Concern Yes     Comment: cup a day    Stress Concern No    Weight Concern Yes     Comment: weight gain    Special Diet No    Exercise No    Seat Belt Yes         ASSESSMENT AND PLAN:     Acute back pain, unspecified back location, unspecified back pain laterality  (primary encounter diagnosis)    Referred to IC for further eval and tx of back pain with radiation into left leg     Meds & Refills for this Visit:  Requested Prescriptions      No prescriptions requested or ordered in this encounter       Duration of  the service:  5 minutes

## 2024-05-07 ENCOUNTER — PATIENT MESSAGE (OUTPATIENT)
Dept: INTERNAL MEDICINE CLINIC | Facility: CLINIC | Age: 54
End: 2024-05-07

## 2024-05-07 DIAGNOSIS — E55.9 VITAMIN D DEFICIENCY: ICD-10-CM

## 2024-05-07 DIAGNOSIS — Z00.00 BLOOD TESTS FOR ROUTINE GENERAL PHYSICAL EXAMINATION: ICD-10-CM

## 2024-05-07 DIAGNOSIS — Z12.31 ENCOUNTER FOR MAMMOGRAM TO ESTABLISH BASELINE MAMMOGRAM: Primary | ICD-10-CM

## 2024-05-07 NOTE — TELEPHONE ENCOUNTER
From: Wendy Greene  To: Jaycee Lee  Sent: 5/7/2024 2:21 PM CDT  Subject: Tests    I need an order for my yearly mammogram and blood tests

## 2024-05-07 NOTE — TELEPHONE ENCOUNTER
See MCM from patient requesting labs and mammogram. Pended orders if appropriate.     Due for CPX, advised to schedule appt.    LOV   5/8/2023 Dr Lee    Last mammogram 6/16/2023  Impression   CONCLUSION:  Stable mammogram       BI-RADS CATEGORY:    DIAGNOSTIC CATEGORY 1--NEGATIVE NO CHANGE FROM COMPARISON ASSESSMENT.       RECOMMENDATIONS:    ROUTINE MAMMOGRAM AND CLINICAL EVALUATION IN 12 MONTHS.

## 2024-05-14 ENCOUNTER — PATIENT MESSAGE (OUTPATIENT)
Dept: INTERNAL MEDICINE CLINIC | Facility: CLINIC | Age: 54
End: 2024-05-14

## 2024-05-14 ENCOUNTER — OFFICE VISIT (OUTPATIENT)
Dept: INTERNAL MEDICINE CLINIC | Facility: CLINIC | Age: 54
End: 2024-05-14

## 2024-05-14 VITALS
DIASTOLIC BLOOD PRESSURE: 70 MMHG | RESPIRATION RATE: 16 BRPM | WEIGHT: 270 LBS | SYSTOLIC BLOOD PRESSURE: 110 MMHG | HEIGHT: 66 IN | HEART RATE: 74 BPM | BODY MASS INDEX: 43.39 KG/M2

## 2024-05-14 DIAGNOSIS — E78.00 HYPERCHOLESTEREMIA: ICD-10-CM

## 2024-05-14 DIAGNOSIS — R73.03 PREDIABETES: ICD-10-CM

## 2024-05-14 DIAGNOSIS — E66.01 OBESITY, MORBID, BMI 40.0-49.9 (HCC): ICD-10-CM

## 2024-05-14 DIAGNOSIS — Z51.81 ENCOUNTER FOR THERAPEUTIC DRUG MONITORING: Primary | ICD-10-CM

## 2024-05-14 DIAGNOSIS — R06.83 SNORING: ICD-10-CM

## 2024-05-14 DIAGNOSIS — E55.9 VITAMIN D DEFICIENCY: ICD-10-CM

## 2024-05-14 PROCEDURE — 3008F BODY MASS INDEX DOCD: CPT | Performed by: NURSE PRACTITIONER

## 2024-05-14 PROCEDURE — 3074F SYST BP LT 130 MM HG: CPT | Performed by: NURSE PRACTITIONER

## 2024-05-14 PROCEDURE — 99214 OFFICE O/P EST MOD 30 MIN: CPT | Performed by: NURSE PRACTITIONER

## 2024-05-14 PROCEDURE — 3078F DIAST BP <80 MM HG: CPT | Performed by: NURSE PRACTITIONER

## 2024-05-14 RX ORDER — PHENTERMINE HYDROCHLORIDE 15 MG/1
15 CAPSULE ORAL EVERY MORNING
Qty: 30 CAPSULE | Refills: 2 | Status: SHIPPED | OUTPATIENT
Start: 2024-05-14

## 2024-05-14 NOTE — PATIENT INSTRUCTIONS
Next steps:  1.  Fill your prescribed medication and take as discussed and prescribed: phentermine 15mg  2.  Schedule a personal nutrition consultation with one of our registered dieticians  3. Check with insurance on coverage for wegovy or zepbound   4. Attend seminar for information for bariatric surgery      Please try to work on the following dietary changes:  Daily protein recommendation to start:  grams  Daily carbohydrate: <140g  Daily calories: 1,700  1.  Drink water with meals and throughout the day, cut down on soda and/or juice if consumed. Consider flavored water options like Bubbly, Spindrift, Hint and Richie.  2.  Eat breakfast daily and focus on having protein with each meal, examples include: greek yogurt, cottage cheese, hard boiled egg, whole grain toast with peanut butter.   3.  Reduce refined carbohydrates and sugars which includes items such as sweets, as well as rice, pasta, and bread and make sure to choose whole grain options when having them with just 1 serving per meal about the size of your inner palm.  4.  Consume non starchy veggies daily working towards making them a good 50% of your daily food intake. Add them to lunch and dinner consistently.  5.  Start a daily probiotic: VSL#3 is recommended, (order on line at www.vsl3.com). Take 1 capsule daily with water for 30 days, then reduce to 1 every other day (this will reduce the cost). Capsules can be left out for 2 weeks, but then must be refrigerated.      Please download garcía My Fitness Pal, LoseIt! Or Net Diary to monitor daily dietary intake and you will be able to see if you are eating the right amount of calories or too much or too little which would hinder weight loss. Additionally this will help to see your daily carbohydrate and protein intake. When you set the garcía up choose 1-2 lbs/week as a goal.  Keeping a paper food journal is an option as well to remain accountable for your choices- this is the start to mindful eating!  Would like to know if the below can be resent again     FAX - [phone]  ATTN: Megan Hancock    A low calorie diet has been consistently shown to support weight loss.     Continue or start exercising to help establish a routine. If not already exercising begin with 1 day and progress as able with long-term goal of 30 minutes 5 days a week at a minimum.     Meditation daily can help manage and control stress. Chronic stress can make weight loss difficult.  Exercising is one way to help with stress, but meditation using the CALM Amisha or another comparable alternative can be done in your home or place of work with little time commitment. This Amisha can also help work on behavior change and improve sleep. Check out the segment under Calm Masterclass and listen to The 4 Pillars of Health. A great way to begin learning about the foundation of lifestyle with practical tips to use in your every day.     Check out www.yourweightmatters.org blog for continued daily support and education along this weight loss journey!    Patient Resources:     Personal Training/Fitness Classes/Health Coaching     [facility] @ [email or web address] Full fitness center with group fitness and personal training. Discount available as client of [facility].  Health Coaching and Personal Training with Kasey Diaz at our [facility]- individual weekly coaching with option to add personal training and small group fitness classes targeted at weight loss- [phone] and/or email @ [email or web address]  360FIT [place] [email or web address] Group Fitness [phone] and/or email Irina at [email or web address]  [facility] Fitness Centers with multiple locations: Med fusion (www.Bandwagon), Eat The Frog Fitness (www.Akermin.Snaptalent), Fit Body Bootcamp (www.ClarobodyboCaptimop.Snaptalent), Bannerman Fitness (www.Snootlab), The Exercise  (www.exercisecoach.Snaptalent)     Online Fitness  Fitness  on  Utube  Fit in 10 DVD series- www.jgcub83BXO.com  Sit and Be Fit - Chair exercise series Www.sitandbefit.org  Hip Hop Fit with Sven Salazar at www.hiphopfit.net     Apps for on the Go Fitness  [place] 7 Minute Workout (orange box with white 7) - free on the go HIIT training amisha  Peloton Amisha @ www.onepeloton.com     Nutrition Trackers and Tools  LoseIT! And My Fitness Pal apps and on line for tracking nutrition  NOOM - virtual health coaching  FitFoundation (healthy meals on the go) in [place] @ www.bspmajapnlogi8tVoyage Medical  Bistro MD @ www.bistromd.com and Hzgjvp83 (keto and low carb plans recommended) @ www.kcmejb06.com, Metabolic Meals @ www.PartnerpediaMetabolicMeals.com - individual prepared meals to go  Gobble, Blue Apron, Home , Every Plate, Sunbasket- on line meal delivery programs for preparation at home  Meal Village in [place] for homemade meals to go @ www.mealvillage.Oppa  Diet Doctor @ www.dietdoctor.com - low carb swaps  Yummly - meal prep and planning amisha (www.yummly.com)     Stress Management/Behavior/Mindful Eating  CALM meditation amisha (www.calm.com)  Headspace  Am I Hungry? Mindful eating virtual  amisha  Www.yourweightmatters.org - Obesity Action Coalition sponsored Blog posts daily  Motivation amisha (black box with white \")- daily supportive messages sent to your phone     Books/Video Education/Podcasts  Mindless Eating by Guanakito Blas  Why We Get Sick by Arturo Castaneda (a book about insulin resistance)  Atomic Habits by Bob Mccall (a book about taking small steps to promote greater behavior change)   Can't Hurt Me by Nick Arguello (a book exploring the power of discipline in achieving your goals)  The End of Dieting: How to Live for Life by Dr. Dillan Fuller M.D. or listen to The WeMontage Podcast Episode 63: Understanding \"Nutritarian\" Eating w/Dr. Dillan Fuller  Your Body in Balance: The New Science of Food, Hormones, and Health by Dr. Kristian Cummins  The Menopause Diet Plan by Virginia Suarez and  Dorota Novak  The Complete Guide to fasting by Dr. Santoro  Sugar, Salt & Fat by Adia Posada, Ph.D, R.D.  Weight Loss Surgery Will Not Treat Food Addiction by Divya Aguero Ph.D  The Game Changers- Sun-eeeix Documentary on plant based nutrition  Fed Up - documentary about obesity (Free on Utube)  The Truth About Sugar - documentary on sugar (Free on Utube, https://youtu.be/0I0bsefVU1x)  The Dr. Martinez T5 Wellness Plan by Dr. Hilario Martinez MD  Fitlosophy Fitspiration - journal to better health (found at [employer] in fitness aisle)  What Happened to You?- a look at the impact trauma has on behavior written by Martita Fowler and Dr. Josse Arredondo  Whole Again by Alberto Sinclair - discovering your true self after trauma  Quincy Tapia talk on Punchbowl, The Call to Courage  Podcasts: The Exam Room by the Physician's Committee, Nutrition Facts by Dr. Meyers    We are here to support you with weight loss, but please remember that you still need your primary care provider for your routine health maintenance.

## 2024-05-14 NOTE — PROGRESS NOTES
HISTORY OF PRESENT ILLNESS  Chief Complaint   Patient presents with    Weight Check     +6 last seen in 10/17/2022     Wendy Greene is a 53 year old female here for follow up with medical weight loss program for the treatment of overweight, obesity, or morbid obesity.     Up #6 lbs (f/u from 10/2022)     Admits that she has been trying to do it on her own, but feels like she needs help with weight loss   Joined gym yesterday  Has questions about bariatric surgery   Exercise/Activity: 2x/ week, via walking, will start going to gym  Nutrition: eating regular meals, +protein, minimal veggies. Not tracking reports  Meals out per week on average: 2  Stress is manageable- 7/10   Sleep: 7 hours/night, waking up feeling rested    Denies chest pain, shortness of breath, dizziness, blurred vision, headache, paresthesia, nausea/vomiting.     Breakfast Lunch Dinner Snacks Fluids   Reviewed              Wt Readings from Last 6 Encounters:   05/14/24 270 lb (122.5 kg)   09/04/23 250 lb (113.4 kg)   05/08/23 267 lb 6.4 oz (121.3 kg)   10/17/22 264 lb (119.7 kg)   10/06/22 267 lb (121.1 kg)   08/30/22 269 lb (122 kg)          REVIEW OF SYSTEMS  GENERAL: feels well otherwise, denied any fevers chills or night sweats   LUNGS: denies shortness of breath  CARDIOVASCULAR: denies chest pain  GI: denies abdominal pain  MUSCULOSKELETAL: +inter. back pain, +joint pains (left knee pain)   PSYCH: denies change in behavior or mood, denies feeling sad or depressed    EXAM  /70   Pulse 74   Resp 16   Ht 5' 6\" (1.676 m)   Wt 270 lb (122.5 kg)   LMP 12/01/2011 (LMP Unknown)   BMI 43.58 kg/m²       GENERAL: well developed, well nourished, in no apparent distress, A/O x3  SKIN: no rashes, no suspicious lesions  HEENT: atraumatic, normocephalic, OP-clear, PERRLA  NECK: supple, no adenopathy  LUNGS: CTA in all fields, breathing non labored  CARDIO: RRR without murmur  GI: +BS, NT/ND, no masses or HSM  EXTREMITIES: no cyanosis, no  clubbing, no edema    Lab Results   Component Value Date    GLU 81 04/18/2022    BUN 12 04/18/2022    BUNCREA 14.9 04/28/2021    CREATSERUM 0.86 04/18/2022    ANIONGAP 5 04/18/2022    GFR 81 10/16/2017    GFRNAA 77 04/18/2022    GFRAA 89 04/18/2022    CA 9.5 04/18/2022    OSMOCALC 293 04/18/2022    ALKPHO 87 04/18/2022    AST 23 04/18/2022    ALT 42 04/18/2022    BILT 0.3 04/18/2022    TP 7.7 04/18/2022    ALB 3.8 04/18/2022    GLOBULIN 3.9 04/18/2022    AGRATIO 1.3 11/25/2011     04/18/2022    K 4.3 04/18/2022     04/18/2022    CO2 30.0 04/18/2022     Lab Results   Component Value Date     04/18/2022    A1C 5.8 (H) 04/18/2022     Lab Results   Component Value Date    CHOLEST 239 (H) 04/18/2022    TRIG 115 04/18/2022    HDL 49 04/18/2022     (H) 04/18/2022    VLDL 23 04/18/2022    TCHDLRATIO 4.28 10/16/2017    NONHDLC 190 (H) 04/18/2022     No results found for: \"B12\", \"VITB12\"  Lab Results   Component Value Date    VITD 25.8 (L) 04/18/2022    QVITD 46 01/17/2011       No current outpatient medications on file prior to visit.     No current facility-administered medications on file prior to visit.       ASSESSMENT/PLAN    ICD-10-CM    1. Encounter for therapeutic drug monitoring  Z51.81       2. Obesity, morbid, BMI 40.0-49.9 (HCC)  E66.01       3. Prediabetes  R73.03       4. Vitamin D deficiency  E55.9       5. Snoring  R06.83       6. Hypercholesteremia  E78.00           PLAN   Initial Weight Data and Goal Weight Loss:  Initial consult: #269 lbs on 8/2022  Weight Calculations  Initial Weight: 269 lbs  Initial Weight Date: 08/02/22  Today's Weight: 270 lbs  5% Goal: 13.45  10% Goal: 26.9  Total Weight Loss: -1 lbs  Total weight loss: up #6 lbs total, Net loss +1 lbs  Will trial medications: phentermine 15mg    --advised of side effects and adverse effects of this medication  Contradictions: has done phentermine in the past- didn't like side effects, rybelsus    Reviewed labs and  outpatient EKG from 9/2023- [facility]; has pended labs to get done with PCP   HLD  stable, follows with PCP   prediabetes, reviewed last a1c 5.8% on 4/2022  Bilat. Knee pain, encouraged aqua therapy   Constipation, miralax, stool sofner, and linzess prn  Information given about bariatric surgery- seminar dates and time   Snoring, has never done sleep study, can possibly think about doing it in the future  Nutrition: Low carb diet, recommended to eat breakfast daily/ regular protein intake  Follow up with dietitian and psychologist as recommended.  Discussed the role of sleep and stress in weight management.  Counseled on comprehensive weight loss plan including attention to nutrition, exercise and behavior/stress management for success. See patient instruction below for more details.  Discussed strategies to overcome barriers to successful weight loss and weight maintenance  FITTE: ACSM recommendations (150-300 minutes/ week in active weight loss)   Weight Loss Consent to treat reviewed and signed.    Total time spent on chart review, pre-charting, obtaining history, counseling, and educating, reviewing labs was 31 minutes.       NOTE TO PATIENT: The 21st Century Cures Act makes clinical notes like these available to patients in the interest of transparency. Clinical notes are medical documents used by physicians and care providers to communicate with each other. These documents include medical language and terminology, abbreviations, and treatment information that may sound technical and at times possibly unfamiliar. In addition, at times, the verbiage may appear blunt or direct. These documents are one tool providers use to communicate relevant information and clinical opinions of the care providers in a way that allows common understanding of the clinical context.     There are no Patient Instructions on file for this visit.    No follow-ups on file.    Patient verbalizes understanding.    Angelique Palacios,  APRN

## 2024-05-15 NOTE — TELEPHONE ENCOUNTER
From: Wendy Greene  To: Angelique Palacios  Sent: 5/14/2024 4:17 PM CDT  Subject: Medicine    [employer] and [employer] don't cover Wegovy, they cover Ozempic. However, they sent you a paper to fill out to petition for Dia Greene

## 2024-06-17 ENCOUNTER — HOSPITAL ENCOUNTER (OUTPATIENT)
Dept: MAMMOGRAPHY | Age: 54
Discharge: HOME | End: 2024-06-17
Attending: INTERNAL MEDICINE

## 2024-06-17 ENCOUNTER — OFFICE VISIT (OUTPATIENT)
Dept: INTERNAL MEDICINE CLINIC | Facility: CLINIC | Age: 54
End: 2024-06-17
Payer: COMMERCIAL

## 2024-06-17 VITALS
RESPIRATION RATE: 16 BRPM | SYSTOLIC BLOOD PRESSURE: 126 MMHG | HEART RATE: 77 BPM | TEMPERATURE: 98 F | HEIGHT: 66 IN | DIASTOLIC BLOOD PRESSURE: 78 MMHG | OXYGEN SATURATION: 95 % | BODY MASS INDEX: 43.23 KG/M2 | WEIGHT: 269 LBS

## 2024-06-17 DIAGNOSIS — E78.00 HYPERCHOLESTEREMIA: Chronic | ICD-10-CM

## 2024-06-17 DIAGNOSIS — Z00.00 ENCOUNTER FOR PREVENTATIVE ADULT HEALTH CARE EXAMINATION: Primary | ICD-10-CM

## 2024-06-17 DIAGNOSIS — Z80.0 FAMILY HISTORY OF COLON CANCER: ICD-10-CM

## 2024-06-17 DIAGNOSIS — Z12.31 ENCOUNTER FOR SCREENING MAMMOGRAM FOR MALIGNANT NEOPLASM OF BREAST: ICD-10-CM

## 2024-06-17 DIAGNOSIS — Z80.3 FAMILY HISTORY OF BREAST CANCER: ICD-10-CM

## 2024-06-17 DIAGNOSIS — R92.30 DENSE BREASTS: ICD-10-CM

## 2024-06-17 DIAGNOSIS — R73.03 PREDIABETES: ICD-10-CM

## 2024-06-17 DIAGNOSIS — M25.561 ACUTE PAIN OF RIGHT KNEE: ICD-10-CM

## 2024-06-17 DIAGNOSIS — Z12.31 ENCOUNTER FOR MAMMOGRAM TO ESTABLISH BASELINE MAMMOGRAM: ICD-10-CM

## 2024-06-17 PROCEDURE — 99396 PREV VISIT EST AGE 40-64: CPT | Performed by: INTERNAL MEDICINE

## 2024-06-17 PROCEDURE — 3078F DIAST BP <80 MM HG: CPT | Performed by: INTERNAL MEDICINE

## 2024-06-17 PROCEDURE — 77063 BREAST TOMOSYNTHESIS BI: CPT | Performed by: INTERNAL MEDICINE

## 2024-06-17 PROCEDURE — 3074F SYST BP LT 130 MM HG: CPT | Performed by: INTERNAL MEDICINE

## 2024-06-17 PROCEDURE — 77067 SCR MAMMO BI INCL CAD: CPT | Performed by: INTERNAL MEDICINE

## 2024-06-17 PROCEDURE — 3008F BODY MASS INDEX DOCD: CPT | Performed by: INTERNAL MEDICINE

## 2024-06-17 NOTE — PATIENT INSTRUCTIONS
- Get blood tests done when fasting (water and medications only for 8 hours)  - Schedule x-ray of right knee.  Schedule through [facility] or call central scheduling at [phone].  - Let us know if you develop pain with bowel movements. Will monitor the pressure for now.  - Send Angelique Palacios/weight loss clinic a message about other options aside from the phentermine for weight loss.   - Follow up with our breast cancer surgeon to discuss your dense breasts and family history of breast cancer:  Dr. Catrina Evans  [address]  [place], [place] [address] [phone]    [address] [address] [id number] [phone]    It was a pleasure seeing you in the clinic today.  Thank you for choosing the [facility] [place] office for your healthcare needs. Please call at [phone] with any questions or concerns.    Jaycee Lee MD

## 2024-06-17 NOTE — PROGRESS NOTES
Wendy Greene is a 53 year old female.   HPI:     Chief Complaint   Patient presents with    Physical     Knee Pain     Patient presents for CPX/wellness examination.  Diet: Trying to eat healthier.   Exercise: Walks on the track once or twice a week.  Vision: Wears glasses and contacts.  Up to date with eye exam - had one in April  Dental: Twice a year - last cleaning in March    Acute issues:  She has been dealing with right knee pain for 1 month.  Mostly with certain movements.  No specific injury to the knee. Pain when going down the stairs.    She has been feeling some pressure in lower back just before she has a bowel movement.  Once she has the bowel movement, pressure goes away.    Chronic issues:  Hypercholesterolemia, prediabetes - lifestyle controlled.    Past medical, family, surgical and social history were reviewed as listed in the chart, and are unchanged from previous visit.  REVIEW OF SYSTEMS:   GENERAL/ const: no fevers/chills, no unintentional weight loss  SKIN: denies any unusual skin lesions  EYES:no vision problems  HEENT: denies sinus pain or sinus tenderness  LUNGS: denies shortness of breath   CARDIOVASCULAR: denies chest pain  GI: abdominal pressure with bowel movements; denies nausea/emesis/ abdominal pain diarrhea constipation  : denies dysuria   MUSCULOSKELETAL: right knee pain  NEURO: denies headaches  PSYCHIATRIC: denies issues  ENDOCRINE: no hot/cold intolerance  ALLERGY: No Known Allergies  PAST HISTORY:     Current Outpatient Medications:     Phentermine HCl 15 MG Oral Cap, Take 1 capsule (15 mg total) by mouth every morning., Disp: 30 capsule, Rfl: 2  Medical:  has a past medical history of Arthritis, Back pain, Constipation, Diarrhea, unspecified, Flatulence/gas pain/belching, Heavy menses, Hemorrhoids, Irregular bowel habits, Menses painful, and Wears glasses.  Surgical:  has a past surgical history that includes bso, omentectomy w/bela; other surgical history (July 2012);  hysterectomy (Dec 2011); colonoscopy (2012); colonoscopy (2017); total abdom hysterectomy; and  (10/17/1986).  Family: family history includes Breast Cancer in her paternal aunt and paternal aunt; Breast Cancer (age of onset: 42) in her sister; Breast Cancer (age of onset: 45) in her paternal aunt; Cancer in her paternal grandmother; Cancer (age of onset: 42) in her sister; Cancer (age of onset: 50) in her paternal aunt; Cancer (age of onset: 63) in her father; Colon Cancer in her paternal aunt; Colon Polyps in her father; Dementia in her maternal grandfather and maternal grandmother; Diabetes in her brother and father; Glaucoma in her father; Hypertension in her brother, father, and mother; OA in her sister; Obesity in her brother and another family member; Other in her father and mother; Prostate Cancer in her father; colonic mass in an other family member.  Social:  reports that she has never smoked. She has never been exposed to tobacco smoke. She has never used smokeless tobacco. She reports current alcohol use. She reports that she does not use drugs.  Wt Readings from Last 6 Encounters:   24 269 lb (122 kg)   24 270 lb (122.5 kg)   23 250 lb (113.4 kg)   23 267 lb 6.4 oz (121.3 kg)   10/17/22 264 lb (119.7 kg)   10/06/22 267 lb (121.1 kg)     EXAM:   /78 (BP Location: Left arm, Patient Position: Sitting, Cuff Size: large)   Pulse 77   Temp 98.2 °F (36.8 °C) (Temporal)   Resp 16   Ht 5' 6\" (1.676 m)   Wt 269 lb (122 kg)   LMP 2011 (LMP Unknown)   SpO2 95%   BMI 43.42 kg/m²   GENERAL: Alert and oriented, well developed, well nourished,in no apparent distress  SKIN: no rashes,no suspicious lesions  HEENT: atraumatic, PERRLA, EOMI, normal lid and conjunctiva, normal external canals and tympanic membranes bilaterally  NECK: supple, no jvd, no thyromegaly, no palpable/tender cervical lymphadenopathy  LUNGS: clear to auscultation bilaterally, no  wheezing/rubs  CARDIO: RRR without murmurs.  No clubbing, cyanosis or edema.  GI: soft non tender nondistended no hepatosplenomegaly, bowel sounds throughout  NEURO: CN II-XII intact, 5/5 strength all extremities  MS: Full ROM, pain in right knee with flexion, squatting  PSYCH: pleasant, appropriate mood and affect  ASSESSMENT AND PLAN:   1.  Encounter for preventative adult health examination  2. Encounter for screening mammogram for malignant neoplasm of breast  3. Family history of breast cancer  4. Dense breasts  5. Family history of colon cancer  Age appropriate health guidance and counseling provided.  Screening labs orders in system, patient will get done when fasting.  Body mass index is 43.42 kg/m².  Working with [facility], on phentermine.  Some constipation with this.  Recommend she follow up with weight loss clinic to discuss other options.  Mammogram scheduled for later today.  History of dense breasts, sister with breast cancer - will have patient follow up with breast surgery to discuss if further imaging would be warranted/indicated.  Colonoscopy done in 2022, next due 2027.  Some lower abdominal pressure before bowel movements, no pain.  As mentioned up to date with colonoscopy.  CT abdomen/pelvis from last fall unremarkable.  Will monitor for now.    6. Hypercholesteremia  7. Prediabetes  Lifestyle controlled.  Lab orders in system.    8. Acute pain of right knee  Patient with right knee pain x 1 month.  No specific injury to the knee that she can remember.  Pain with flexion, squatting today in office.  Will check XR knee.  Mild arthritis on x-rays from 2018.  May consider course of prescription NSAIDS and/or Orthopedics referral based on x-ray results and course of symptoms.    - XR KNEE (3 VIEWS), RIGHT (CPT=73562); Future    Patient Care Team:  Jaycee Lee MD as PCP - General (Internal Medicine)  Angelique Palacios APRN (Nurse Practitioner Family)  The patient indicates  understanding of these issues and agrees to the plan.  The patient is asked to return to clinic in 12 months for follow up on chronic issues/CPX, or earlier if acute issues arise.    Jaycee Lee MD

## 2024-06-18 ENCOUNTER — HOSPITAL ENCOUNTER (OUTPATIENT)
Dept: GENERAL RADIOLOGY | Age: 54
Discharge: HOME | End: 2024-06-18
Attending: INTERNAL MEDICINE

## 2024-06-18 DIAGNOSIS — M25.561 ACUTE PAIN OF RIGHT KNEE: ICD-10-CM

## 2024-06-18 PROCEDURE — 73562 X-RAY EXAM OF KNEE 3: CPT | Performed by: INTERNAL MEDICINE

## 2024-06-19 DIAGNOSIS — M25.561 ACUTE PAIN OF RIGHT KNEE: Primary | ICD-10-CM

## 2024-06-19 PROBLEM — M17.11 PRIMARY OSTEOARTHRITIS OF RIGHT KNEE: Chronic | Status: ACTIVE | Noted: 2024-06-19

## 2024-06-19 PROBLEM — M17.11 PRIMARY OSTEOARTHRITIS OF RIGHT KNEE: Status: ACTIVE | Noted: 2024-06-19

## 2024-06-19 RX ORDER — MELOXICAM 7.5 MG/1
7.5 TABLET ORAL DAILY
Qty: 15 TABLET | Refills: 0 | Status: SHIPPED | OUTPATIENT
Start: 2024-06-19

## 2024-07-31 ENCOUNTER — HOSPITAL ENCOUNTER (OUTPATIENT)
Dept: CT IMAGING | Age: 54
Discharge: HOME | End: 2024-07-31
Attending: INTERNAL MEDICINE

## 2024-07-31 DIAGNOSIS — Z13.6 SCREENING FOR HEART DISEASE: ICD-10-CM

## 2024-08-06 DIAGNOSIS — M25.561 ACUTE PAIN OF RIGHT KNEE: ICD-10-CM

## 2024-08-06 RX ORDER — MELOXICAM 7.5 MG/1
7.5 TABLET ORAL DAILY
Qty: 15 TABLET | Refills: 0 | Status: SHIPPED | OUTPATIENT
Start: 2024-08-06

## 2024-08-06 NOTE — TELEPHONE ENCOUNTER
LOV:  6/17/2024  RTC:  12 months  Filled: 6/19/2024  Recent Labs: 9/4/2023    Upcoming OV  none     Noted.

## 2024-10-08 DIAGNOSIS — M25.561 ACUTE PAIN OF RIGHT KNEE: ICD-10-CM

## 2024-10-08 RX ORDER — MELOXICAM 7.5 MG/1
7.5 TABLET ORAL DAILY
Qty: 15 TABLET | Refills: 0 | Status: SHIPPED | OUTPATIENT
Start: 2024-10-08

## 2024-10-08 NOTE — TELEPHONE ENCOUNTER
LOV: 6/17/2024 with Dr. Lee   RTC: 12 months  Last Relevant Labs: 9/4/2023  Filled: 8/6/2024    #15 with 0 refills    No future appointments.

## 2025-01-22 ENCOUNTER — HOSPITAL ENCOUNTER (OUTPATIENT)
Age: 55
Discharge: HOME | End: 2025-01-22
Attending: EMERGENCY MEDICINE
Payer: COMMERCIAL

## 2025-01-22 ENCOUNTER — APPOINTMENT (OUTPATIENT)
Dept: GENERAL RADIOLOGY | Age: 55
End: 2025-01-22
Attending: EMERGENCY MEDICINE
Payer: COMMERCIAL

## 2025-01-22 VITALS
DIASTOLIC BLOOD PRESSURE: 81 MMHG | OXYGEN SATURATION: 98 % | TEMPERATURE: 98 F | RESPIRATION RATE: 22 BRPM | SYSTOLIC BLOOD PRESSURE: 144 MMHG | HEART RATE: 65 BPM

## 2025-01-22 DIAGNOSIS — S39.012A STRAIN OF LUMBAR REGION, INITIAL ENCOUNTER: Primary | ICD-10-CM

## 2025-01-22 PROCEDURE — 99214 OFFICE O/P EST MOD 30 MIN: CPT

## 2025-01-22 PROCEDURE — 99215 OFFICE O/P EST HI 40 MIN: CPT

## 2025-01-22 PROCEDURE — 72110 X-RAY EXAM L-2 SPINE 4/>VWS: CPT | Performed by: EMERGENCY MEDICINE

## 2025-01-22 RX ORDER — LIDOCAINE 50 MG/G
1 PATCH TOPICAL EVERY 24 HOURS
Qty: 14 EACH | Refills: 0 | Status: SHIPPED | OUTPATIENT
Start: 2025-01-22

## 2025-01-22 RX ORDER — CYCLOBENZAPRINE HCL 10 MG
10 TABLET ORAL 3 TIMES DAILY PRN
Qty: 20 TABLET | Refills: 0 | Status: SHIPPED | OUTPATIENT
Start: 2025-01-22 | End: 2025-01-29

## 2025-01-22 NOTE — ED INITIAL ASSESSMENT (HPI)
Pt states bending last night and felt a sharp pain to left lower back.    Pain on mov/t and bending.    Denies any numbness to leg.

## 2025-01-22 NOTE — DISCHARGE INSTRUCTIONS
Lidoderm patch as needed.  Cyclobenzaprine for pain but this is a muscle relaxer that can make you drowsy so do not work or drive when taking.

## 2025-01-22 NOTE — ED PROVIDER NOTES
Patient Seen in: [facility]      History     Chief Complaint   Patient presents with    Back Pain     Stated Complaint: Lower lt back pain    Subjective:   HPI      Patient is a 54-year-old female presenting with left-sided low back pain.  Felt immediate pull/pop when she bent over to put dishes in the  last night.  Pain since.  No radiation to the other side, up higher in her back or down her left leg at all.    Objective:     Past Medical History:    Arthritis    Knee    Back pain    Constipation    Diarrhea, unspecified    Flatulence/gas pain/belching    Heavy menses    Hysterectomy    Hemorrhoids    Irregular bowel habits    Menses painful    Hysterectomy    Wears glasses              Past Surgical History:   Procedure Laterality Date    Bso, omentectomy w/bela      Colonoscopy  2012    diverticuli, int hemorrhoids, repeat in 10 years - Dr. Childers    Colonoscopy  2017    int hemorrhoids - repeat 3 years d/t family hx - Dr. Schofield    Hysterectomy  Dec 2011    total with BSO (Dr Mock)      10/17/1986    Other surgical history  2012    EGD - Dr. Childers - mild gastritis, gastric atrophy    Total abdom hysterectomy                  No pertinent social history.            Review of Systems    Positive for stated complaint: Lower lt back pain  Other systems are as noted in HPI.  Constitutional and vital signs reviewed.      All other systems reviewed and negative except as noted above.    Physical Exam     ED Triage Vitals [25 0938]   /81   Pulse 65   Resp 22   Temp 98.3 °F (36.8 °C)   Temp src Oral   SpO2 98 %   O2 Device None (Room air)       Current Vitals:   Vital Signs  BP: 144/81  Pulse: 65  Resp: 22  Temp: 98.3 °F (36.8 °C)  Temp src: Oral    Oxygen Therapy  SpO2: 98 %  O2 Device: None (Room air)        Physical Exam  Vitals and nursing note reviewed.   Constitutional:       Appearance: She is well-developed.   HENT:      Head: Normocephalic and  atraumatic.   Eyes:      Conjunctiva/sclera: Conjunctivae normal.      Pupils: Pupils are equal, round, and reactive to light.   Cardiovascular:      Rate and Rhythm: Normal rate and regular rhythm.      Heart sounds: Normal heart sounds.   Pulmonary:      Effort: Pulmonary effort is normal.      Breath sounds: Normal breath sounds.   Abdominal:      General: Bowel sounds are normal.      Palpations: Abdomen is soft.   Musculoskeletal:         General: Normal range of motion.      Comments: Mild reproducible tenderness in the left lumbar paraspinal muscles.  No midline step-off or deformity.   Skin:     General: Skin is warm and dry.   Neurological:      Mental Status: She is alert and oriented to person, place, and time.             ED Course   Labs Reviewed - No data to display         XR LUMBAR SPINE (MIN 4 VIEWS) (CPT=72110)    Result Date: 1/22/2025  PROCEDURE:  XR LUMBAR SPINE (MIN 4 VIEWS) (CPT=72110)  TECHNIQUE:  AP, lateral, oblique, and coned down L5-S1 views were obtained.  COMPARISON:  [facility], XR, SPINE LUMBOSACRAL COMPL W/ OBL, 10/18/2008, 2:28 PM.  INDICATIONS:  Lower lt back pain  PATIENT STATED HISTORY: (As transcribed by Technologist)  pain to left lower back after bending over last night    .             CONCLUSION:  Lumbar vertebral bodies maintain normal height with straightening of the usual lumbar lordosis.  No spondylolisthesis.  Mild disc height loss and small endplate osteophytes noted at the L2-3 and L3-4 levels.  No significant facet arthrosis or appreciable pars defects.  No destructive osseous lesions.   LOCATION:  [facility]   Dictated by (CST): Oj Escobedo MD on 1/22/2025 at 10:59 AM     Finalized by (CST): Oj Escobedo MD on 1/22/2025 at 11:00 AM             MDM      54-year-old female presenting for evaluation of left-sided low back pain as detailed above.  No signs or symptoms concerning for lumbar radiculopathy or cauda equina syndrome.  Will check back x-rays to rule out bony  abnormality or fracture although overall suspicion is low that there is no significant trauma.    Update at 11:30 AM.  X-rays unremarkable with no acute bony injury or fracture.  Lidoderm, Flexeril for home.  Consistent with lumbar strain.        Past Medical History-none    Differential diagnosis before testing included muscle sprain, fracture    Co-morbidities that add to the complexity of management include: None    Testing ordered during this visit included lumbar spine x-ray    Radiographic images  I personally reviewed the radiographs and my individual interpretation shows no fracture or dislocation  I also reviewed the official reports that showed no fracture or dislocation            Disposition:        Discharge  I have discussed with the patient the results of test, differential diagnosis, treatment plan, warning signs and symptoms which should prompt immediate return.  They expressed understanding of these instructions and agrees to the following plan provided.  They were given written discharge instructions and agrees to return for any concerns and voiced understanding and all questions were answered.      Medical Decision Making      Disposition and Plan     Clinical Impression:  1. Strain of lumbar region, initial encounter         Disposition:  Discharge  1/22/2025 11:34 am    Follow-up:  Jaycee Lee MD  [address] [facility] [address]  [phone]                Medications Prescribed:  Current Discharge Medication List        START taking these medications    Details   cyclobenzaprine 10 MG Oral Tab Take 1 tablet (10 mg total) by mouth 3 (three) times daily as needed for Muscle spasms.  Qty: 20 tablet, Refills: 0      lidocaine 5 % External Patch Place 1 patch onto the skin daily. Leave on for maximum of 12 hours, then leave off for 12 hours before reapplying.  Qty: 14 each, Refills: 0    Associated Diagnoses: Strain of lumbar region, initial encounter                 Supplementary  Documentation:

## 2025-04-16 DIAGNOSIS — M25.561 ACUTE PAIN OF RIGHT KNEE: ICD-10-CM

## 2025-04-17 RX ORDER — MELOXICAM 7.5 MG/1
7.5 TABLET ORAL DAILY
Qty: 15 TABLET | Refills: 0 | Status: SHIPPED | OUTPATIENT
Start: 2025-04-17

## 2025-04-17 NOTE — TELEPHONE ENCOUNTER
LOV: 6/17/2024 with Dr. Lee  RTC: 12 months  Last Relevant Labs: 9/4/2023  Filled: 10/8/2024    #15 with 0 refills    No future appointments.

## 2025-04-25 ENCOUNTER — LAB ENCOUNTER (OUTPATIENT)
Dept: LAB | Age: 55
End: 2025-04-25
Attending: INTERNAL MEDICINE
Payer: COMMERCIAL

## 2025-04-25 DIAGNOSIS — E55.9 VITAMIN D DEFICIENCY: ICD-10-CM

## 2025-04-25 DIAGNOSIS — Z00.00 BLOOD TESTS FOR ROUTINE GENERAL PHYSICAL EXAMINATION: ICD-10-CM

## 2025-04-25 LAB
ALBUMIN SERPL-MCNC: 4.9 G/DL (ref 3.2–4.8)
ALBUMIN/GLOB SERPL: 1.5 {RATIO} (ref 1–2)
ALP LIVER SERPL-CCNC: 76 U/L (ref 46–118)
ALT SERPL-CCNC: 27 U/L (ref 10–49)
ANION GAP SERPL CALC-SCNC: 11 MMOL/L (ref 0–18)
AST SERPL-CCNC: 20 U/L (ref ?–34)
BASOPHILS # BLD AUTO: 0.06 X10(3) UL (ref 0–0.2)
BASOPHILS NFR BLD AUTO: 0.9 %
BILIRUB SERPL-MCNC: 0.4 MG/DL (ref 0.3–1.2)
BUN BLD-MCNC: 13 MG/DL (ref 9–23)
CALCIUM BLD-MCNC: 10.1 MG/DL (ref 8.7–10.6)
CHLORIDE SERPL-SCNC: 106 MMOL/L (ref 98–112)
CHOLEST SERPL-MCNC: 233 MG/DL (ref ?–200)
CO2 SERPL-SCNC: 26 MMOL/L (ref 21–32)
CREAT BLD-MCNC: 0.98 MG/DL (ref 0.55–1.02)
EGFRCR SERPLBLD CKD-EPI 2021: 68 ML/MIN/1.73M2 (ref 60–?)
EOSINOPHIL # BLD AUTO: 0.08 X10(3) UL (ref 0–0.7)
EOSINOPHIL NFR BLD AUTO: 1.1 %
ERYTHROCYTE [DISTWIDTH] IN BLOOD BY AUTOMATED COUNT: 14.6 %
EST. AVERAGE GLUCOSE BLD GHB EST-MCNC: 117 MG/DL (ref 68–126)
FASTING PATIENT LIPID ANSWER: YES
FASTING STATUS PATIENT QL REPORTED: YES
GLOBULIN PLAS-MCNC: 3.2 G/DL (ref 2–3.5)
GLUCOSE BLD-MCNC: 93 MG/DL (ref 70–99)
HBA1C MFR BLD: 5.7 % (ref ?–5.7)
HCT VFR BLD AUTO: 45.1 % (ref 35–48)
HDLC SERPL-MCNC: 47 MG/DL (ref 40–59)
HGB BLD-MCNC: 14.6 G/DL (ref 12–16)
IMM GRANULOCYTES # BLD AUTO: 0.01 X10(3) UL (ref 0–1)
IMM GRANULOCYTES NFR BLD: 0.1 %
LDLC SERPL CALC-MCNC: 168 MG/DL (ref ?–100)
LYMPHOCYTES # BLD AUTO: 2.66 X10(3) UL (ref 1–4)
LYMPHOCYTES NFR BLD AUTO: 37.9 %
MCH RBC QN AUTO: 29 PG (ref 26–34)
MCHC RBC AUTO-ENTMCNC: 32.4 G/DL (ref 31–37)
MCV RBC AUTO: 89.7 FL (ref 80–100)
MONOCYTES # BLD AUTO: 0.46 X10(3) UL (ref 0.1–1)
MONOCYTES NFR BLD AUTO: 6.6 %
NEUTROPHILS # BLD AUTO: 3.74 X10 (3) UL (ref 1.5–7.7)
NEUTROPHILS # BLD AUTO: 3.74 X10(3) UL (ref 1.5–7.7)
NEUTROPHILS NFR BLD AUTO: 53.4 %
NONHDLC SERPL-MCNC: 186 MG/DL (ref ?–130)
OSMOLALITY SERPL CALC.SUM OF ELEC: 296 MOSM/KG (ref 275–295)
PLATELET # BLD AUTO: 270 10(3)UL (ref 150–450)
POTASSIUM SERPL-SCNC: 4.3 MMOL/L (ref 3.5–5.1)
PROT SERPL-MCNC: 8.1 G/DL (ref 5.7–8.2)
RBC # BLD AUTO: 5.03 X10(6)UL (ref 3.8–5.3)
SODIUM SERPL-SCNC: 143 MMOL/L (ref 136–145)
TRIGL SERPL-MCNC: 100 MG/DL (ref 30–149)
TSI SER-ACNC: 1.08 UIU/ML (ref 0.55–4.78)
VIT D+METAB SERPL-MCNC: 18.6 NG/ML (ref 30–100)
VLDLC SERPL CALC-MCNC: 20 MG/DL (ref 0–30)
WBC # BLD AUTO: 7 X10(3) UL (ref 4–11)

## 2025-04-25 PROCEDURE — 85025 COMPLETE CBC W/AUTO DIFF WBC: CPT

## 2025-04-25 PROCEDURE — 36415 COLL VENOUS BLD VENIPUNCTURE: CPT

## 2025-04-25 PROCEDURE — 84443 ASSAY THYROID STIM HORMONE: CPT

## 2025-04-25 PROCEDURE — 83036 HEMOGLOBIN GLYCOSYLATED A1C: CPT

## 2025-04-25 PROCEDURE — 80061 LIPID PANEL: CPT

## 2025-04-25 PROCEDURE — 80053 COMPREHEN METABOLIC PANEL: CPT

## 2025-04-25 PROCEDURE — 82306 VITAMIN D 25 HYDROXY: CPT

## 2025-04-28 DIAGNOSIS — E55.9 VITAMIN D DEFICIENCY: Primary | ICD-10-CM

## 2025-04-28 RX ORDER — ERGOCALCIFEROL 1.25 MG/1
50000 CAPSULE, LIQUID FILLED ORAL WEEKLY
Qty: 12 CAPSULE | Refills: 0 | Status: SHIPPED | OUTPATIENT
Start: 2025-04-28

## 2025-05-01 ENCOUNTER — OFFICE VISIT (OUTPATIENT)
Dept: INTERNAL MEDICINE CLINIC | Facility: CLINIC | Age: 55
End: 2025-05-01
Payer: COMMERCIAL

## 2025-05-01 VITALS
WEIGHT: 270.81 LBS | HEART RATE: 72 BPM | DIASTOLIC BLOOD PRESSURE: 82 MMHG | BODY MASS INDEX: 44 KG/M2 | SYSTOLIC BLOOD PRESSURE: 126 MMHG | OXYGEN SATURATION: 97 % | TEMPERATURE: 98 F

## 2025-05-01 DIAGNOSIS — E66.813 OBESITY, CLASS III, BMI 40-49.9 (MORBID OBESITY): ICD-10-CM

## 2025-05-01 DIAGNOSIS — E78.00 HYPERCHOLESTEREMIA: Primary | Chronic | ICD-10-CM

## 2025-05-01 DIAGNOSIS — Z12.31 ENCOUNTER FOR SCREENING MAMMOGRAM FOR MALIGNANT NEOPLASM OF BREAST: ICD-10-CM

## 2025-05-01 DIAGNOSIS — R73.03 PREDIABETES: ICD-10-CM

## 2025-05-01 DIAGNOSIS — E55.9 VITAMIN D DEFICIENCY: ICD-10-CM

## 2025-05-01 PROCEDURE — 3079F DIAST BP 80-89 MM HG: CPT | Performed by: INTERNAL MEDICINE

## 2025-05-01 PROCEDURE — 3074F SYST BP LT 130 MM HG: CPT | Performed by: INTERNAL MEDICINE

## 2025-05-01 PROCEDURE — 99214 OFFICE O/P EST MOD 30 MIN: CPT | Performed by: INTERNAL MEDICINE

## 2025-05-01 NOTE — PROGRESS NOTES
Wendy Greene is a 54 year old female.   HPI:     Chief Complaint   Patient presents with    Eye Exam     Completed last week and was informed there may be HBP or diabetes present.      Patient presents for follow up on chronic medical issues.  Blood pressure mildly elevated today.  She notes she had eye exam last week, was noted to have possible hypertensive or diabetic changes in retinal exam.   Hypercholesterolemia - lifestyle controlled.  Prediabetes - lifestyle controlled.  Fasting glucose actually normal this week.  Vitamin D deficiency - started on prescription vitamin D this week.   Obesity - Body mass index is 43.71 kg/m².  Had constipation with phentermine last year.  Significant GI side effects with Rybelsus in the past.    Past medical, family, surgical and social history were reviewed as listed in the chart, and are unchanged from previous visit.  REVIEW OF SYSTEMS:   GENERAL/ const: no fevers/chills, no unintentional weight loss  EYES:no vision problems  HEENT: denies sinus pain or sinus tenderness  LUNGS: denies shortness of breath   CARDIOVASCULAR: denies chest pain  GI: denies nausea/emesis/ abdominal pain diarrhea constipation  : denies dysuria   NEURO: denies headaches  PSYCHIATRIC: denies issues  ENDOCRINE: no hot/cold intolerance  ALLERGY: Allergies[1]  PAST HISTORY:   Medications - Current[2]  Medical:  has a past medical history of Arthritis, Back pain, Constipation, Diarrhea, unspecified, Flatulence/gas pain/belching, Heavy menses, Hemorrhoids, Irregular bowel habits, Menses painful, and Wears glasses.  Surgical:  has a past surgical history that includes bso, omentectomy w/bela; other surgical history (2012); hysterectomy (Dec 2011); colonoscopy (2012); colonoscopy (2017); total abdom hysterectomy; and  (10/17/1986).  Family: family history includes Breast Cancer in her paternal aunt and paternal aunt; Breast Cancer (age of onset: 42) in her sister; Breast Cancer (age  of onset: 45) in her paternal aunt; Cancer in her paternal grandmother; Cancer (age of onset: 42) in her sister; Cancer (age of onset: 50) in her paternal aunt; Cancer (age of onset: 63) in her father; Colon Cancer in her paternal aunt; Colon Polyps in her father; Dementia in her maternal grandfather and maternal grandmother; Diabetes in her brother and father; Glaucoma in her father; Hypertension in her brother, father, and mother; OA in her sister; Obesity in her brother and another family member; Other in her father and mother; Prostate Cancer in her father; colonic mass in an other family member.  Social:  reports that she has never smoked. She has never been exposed to tobacco smoke. She has never used smokeless tobacco. She reports current alcohol use. She reports that she does not use drugs.  Wt Readings from Last 6 Encounters:   05/01/25 270 lb 12.8 oz (122.8 kg)   06/17/24 269 lb (122 kg)   05/14/24 270 lb (122.5 kg)   09/04/23 250 lb (113.4 kg)   05/08/23 267 lb 6.4 oz (121.3 kg)   10/17/22 264 lb (119.7 kg)     EXAM:   /82 (BP Location: Left arm, Patient Position: Sitting, Cuff Size: large)   Pulse 72   Temp 97.9 °F (36.6 °C) (Temporal)   Wt 270 lb 12.8 oz (122.8 kg)   LMP 12/01/2011 (LMP Unknown)   SpO2 97%   Breastfeeding No   BMI 43.71 kg/m²   GENERAL: Alert and oriented, well developed, well nourished,in no apparent distress  HEENT: atraumatic, PERRLA, EOMI, normal lid and conjunctiva  LUNGS: clear to auscultation bilaterally, no wheezing/rubs  CARDIO: RRR without murmurs.  No clubbing, cyanosis or edema.  GI: soft non tender nondistended no hepatosplenomegaly, bowel sounds throughout  PSYCH: pleasant, appropriate mood and affect  ASSESSMENT AND PLAN:   1. Hypercholesteremia  Lipids elevated.  10 year ASCVD risk of 4.5%.  Coronary artery calcium score of 0 a year ago.  Per patient her recent eye exam they saw possible hypertensive or diabetes related changes in eye.  Repeat blood pressure  reading fine.  Advised patient to see if her insurance will cover Apo B, hsCRP, Lpa testing - if so will order.  If those tests are high will start statin therapy.    2. Prediabetes  Lifestyle controlled.  Fasting glucose actually normal, A1c at 5.7%.  Will try metformin for obesity and prediabetes.  - metFORMIN 500 MG Oral Tab; Take 1 tablet (500 mg total) by mouth 3 (three) times daily with meals.  Dispense: 90 tablet; Refill: 2    3. Vitamin D deficiency  Started on prescription vitamin D this week.    4. Obesity, Class III, BMI 40-49.9 (morbid obesity)  Body mass index is 43.71 kg/m².  Trouble losing weight. Saw [facility] last year.  Started on phentermine - she had constipation.  GI side effects with Rybelsus in the past so will avoid Zepbound/Wegovy.  Will try metformin.  Also gave information for [facility].    - metFORMIN 500 MG Oral Tab; Take 1 tablet (500 mg total) by mouth 3 (three) times daily with meals.  Dispense: 90 tablet; Refill: 2    5. Encounter for screening mammogram for malignant neoplasm of breast  Mammogram due in June - order entered.  - [facility] BIMAL 2D+3D SCREENING BILAT (CPT=77067/59287); Future    Patient Care Team:  Jaycee Lee MD as PCP - General (Internal Medicine)  Angelique Palacios APRN (Nurse Practitioner Family)  The patient indicates understanding of these issues and agrees to the plan.  The patient is asked to return to clinic in 2-3 months for follow up on chronic issues and obesity if she does not follow up with weight loss clinic (otherwise in 1 year for CPX), or earlier if acute issues arise.    Jaycee Lee MD           [1] No Known Allergies  [2]   Current Outpatient Medications:     metFORMIN 500 MG Oral Tab, Take 1 tablet (500 mg total) by mouth 3 (three) times daily with meals., Disp: 90 tablet, Rfl: 2    ergocalciferol 1.25 MG (82770 UT) Oral Cap, Take 1 capsule (50,000 Units total) by mouth once a week., Disp: 12 capsule, Rfl: 0    Meloxicam 7.5 MG  Oral Tab, Take 1 tablet (7.5 mg total) by mouth daily., Disp: 15 tablet, Rfl: 0

## 2025-05-01 NOTE — PATIENT INSTRUCTIONS
- Check with your insurance to see if these additional cholesterol tests are covered:  Name of tests:  Apolipoprotein B  Lipoprotein A  High-sensitivity CRP    Diagnosis:  Hypercholesterolemia (E78.00)    If they are covered let us know (can send [employer] message) and I will enter an order    - Blood pressure was fine on repeat reading.  - A1c test was just barely in prediabetes range.  Unlikely this would cause eye issues.  - Will start on metformin to see if that helps with weight.  Take once daily with food for first couple of weeks, if you tolerate that dose can increase to twice daily with food and finally to three times daily with food.    - Can follow up with [facility]:  [address] [place] [address], [address]  [address]  [place] [address]  [phone]    Otherwise can follow up with me in 2-3 months to see how you are doing on the metformin.    - Schedule mammogram for June 17th or later.  Can schedule through [employer] or call central scheduling at [phone].    It was a pleasure seeing you in the clinic today.  Thank you for choosing the [facility] [place] office for your healthcare needs. Please call at [phone] with any questions or concerns.    Jaycee Lee MD

## 2025-05-17 ENCOUNTER — LAB ENCOUNTER (OUTPATIENT)
Dept: LAB | Age: 55
End: 2025-05-17
Attending: INTERNAL MEDICINE
Payer: COMMERCIAL

## 2025-05-17 DIAGNOSIS — E78.00 HYPERCHOLESTEREMIA: Chronic | ICD-10-CM

## 2025-05-17 LAB — CRP SERPL HS-MCNC: 5.67 MG/L (ref ?–3)

## 2025-05-17 PROCEDURE — 86141 C-REACTIVE PROTEIN HS: CPT

## 2025-05-17 PROCEDURE — 82172 ASSAY OF APOLIPOPROTEIN: CPT

## 2025-05-17 PROCEDURE — 83695 ASSAY OF LIPOPROTEIN(A): CPT

## 2025-05-17 PROCEDURE — 36415 COLL VENOUS BLD VENIPUNCTURE: CPT

## 2025-05-20 LAB — LIPOPROTEIN (A): 303.9 NMOL/L

## 2025-05-21 DIAGNOSIS — E78.00 HYPERCHOLESTEREMIA: Primary | Chronic | ICD-10-CM

## 2025-05-21 DIAGNOSIS — E78.41 ELEVATED LIPOPROTEIN(A): ICD-10-CM

## 2025-05-21 RX ORDER — ATORVASTATIN CALCIUM 10 MG/1
10 TABLET, FILM COATED ORAL NIGHTLY
Qty: 90 TABLET | Refills: 1 | Status: SHIPPED | OUTPATIENT
Start: 2025-05-21

## 2025-05-22 LAB — APOLIPOPROTEIN B: 116 MG/DL

## 2025-06-19 ENCOUNTER — HOSPITAL ENCOUNTER (OUTPATIENT)
Dept: MAMMOGRAPHY | Age: 55
Discharge: HOME | End: 2025-06-19
Attending: INTERNAL MEDICINE
Payer: COMMERCIAL

## 2025-06-19 DIAGNOSIS — Z12.31 ENCOUNTER FOR SCREENING MAMMOGRAM FOR MALIGNANT NEOPLASM OF BREAST: ICD-10-CM

## 2025-06-19 PROCEDURE — 77067 SCR MAMMO BI INCL CAD: CPT | Performed by: INTERNAL MEDICINE

## 2025-06-19 PROCEDURE — 77063 BREAST TOMOSYNTHESIS BI: CPT | Performed by: INTERNAL MEDICINE

## 2025-07-22 DIAGNOSIS — E55.9 VITAMIN D DEFICIENCY: ICD-10-CM

## 2025-07-22 RX ORDER — ERGOCALCIFEROL 1.25 MG/1
50000 CAPSULE, LIQUID FILLED ORAL WEEKLY
Qty: 12 CAPSULE | Refills: 0 | Status: SHIPPED | OUTPATIENT
Start: 2025-07-22

## 2025-07-22 NOTE — TELEPHONE ENCOUNTER
LOV: 5/1/2025 with Dr. Lee  RTC: 2-3 months  Last Relevant Labs: 4/25/2025 (Last Vitamin D Level: 18.6 ng/mL)  Filled: 4/28/2025    #12 with 0 refills    No future appointments.

## (undated) DIAGNOSIS — Z00.00 PREVENTATIVE HEALTH CARE: ICD-10-CM

## (undated) DIAGNOSIS — Z80.0 FAMILY HISTORY OF MALIGNANT NEOPLASM OF COLON: ICD-10-CM

## (undated) DIAGNOSIS — R31.29 MICROSCOPIC HEMATURIA: ICD-10-CM

## (undated) DIAGNOSIS — E55.9 VITAMIN D DEFICIENCY: ICD-10-CM

## (undated) DIAGNOSIS — Z12.31 ENCOUNTER FOR SCREENING MAMMOGRAM FOR MALIGNANT NEOPLASM OF BREAST: Primary | ICD-10-CM

## (undated) DIAGNOSIS — E78.00 HYPERCHOLESTEREMIA: ICD-10-CM

## (undated) DIAGNOSIS — R73.03 PREDIABETES: ICD-10-CM

## (undated) NOTE — LETTER
Date: 1/22/2018    Patient Name: Nancy Born          To Whom it may concern: The above patient was seen at the [facility] for treatment of a medical condition today (1/22/18).     This patient should be excused from any time missed fro

## (undated) NOTE — LETTER
05/12/23        Hearne Senia  MICHAEL [address] [address]      Dear Jessica Viramontes,    [facility] records indicate that you have outstanding lab work and or testing that was ordered for you and has not yet been completed:  Orders Placed This Encounter      Comp Metabolic Panel (14)      CBC With Differential With Platelet      Hemoglobin A1C      Lipid Panel      TSH W Reflex To Free T4    To provide you with the best possible care, please complete these orders at your earliest convenience. If you have recently completed these orders please disregard this letter. If you have any questions please call the office at Dept: [phone].      Thank you,       Tana Hernandez MD

## (undated) NOTE — LETTER
Date & Time: 1/28/2021, 9:57 AM  Patient: Chelle Adorno  Encounter Provider(s):    Pascal Spatz, APRN       To Whom It May Concern:    Ronaldo Navarro was seen and treated in our department on 1/28/2021. She should not return to work until 1/29/21.

## (undated) NOTE — LETTER
Date & Time: 5/11/2022, 12:28 PM  Patient: Dayday Mujica  Encounter Provider(s):    Lorne Isaac MD       To Whom It May Concern:    Ruth Guzman was seen and treated in our department on 5/11/2022. She should not return to work until 5/16/22.     If you have any questions or concerns, please do not hesitate to call.        _____________________________  Physician/APC Signature

## (undated) NOTE — MR AVS SNAPSHOT
[place]  [address] [facility] [address]  [address] [facility] [address] [phone]               Thank you for choosing us for your health care visit with Sabrina Tolbert MD.  We are glad to serve you and happy to provide you with this summa · Food allergies, such as milk allergy in young children  · Medicines  · Inflammation of the GI tract (gastritis or esophagitis)  · Colitis (Crohn's disease or ulcerative colitis  · Cancer (tumors or polyps)  · Abnormal pouches in the colon (diverticula) inside your body. · Enteroscopy. This sends a flexible tube or a small, swallowed capsule camera into your small intestine.   When to call your healthcare provider  Call your healthcare provider right away if you have any of the following:  · Bleeding from authorization numbers or be assured that none are required. You can then schedule your appointment. Failure to obtain required authorization numbers can create reimbursement difficulties for you.       Referral Information     Referral Order Referred to Add

## (undated) NOTE — LETTER
05/05/22        Clarisa RODRIGUEZ [phone]      Dear Christal Nice,    [facility] records indicate that you have outstanding lab work and or testing that was ordered for you and has not yet been completed:  Orders Placed This Encounter      CBC W Differential W Platelet [E]      Comp Metabolic Panel (14) [E]      Lipid Panel [E]      Hemoglobin A1C [E]      Vitamin D [E]      TSH W Reflex To Free T4 [E]      Urinalysis, Routine [E]      [facility] BIMAL 2D+3D SCREENING BILAT (CPT=77067/33791)    To provide you with the best possible care, please complete these orders at your earliest convenience. If you have recently completed these orders please disregard this letter. If you have any questions please call the office at Dept: [phone].      Thank you,       Maya Olivo MD

## (undated) NOTE — MR AVS SNAPSHOT
[facility]  [address] [address] [phone]               Thank you for choosing us for your health care visit with Jennifer Sims MD.  We are glad to serve you and happy to provide you with this summary of yo discharge instructions in [employer] by going to Visits < Admission Summaries. If you've been to the Emergency Department or your doctor's office, you can view your past visit information in [employer] by going to Visits < Visit Summaries. [email or web address] questions?

## (undated) NOTE — LETTER
03/02/20        Shanta RODRIGUEZ [address] [address]      Dear [facility],    [address] [facility] records indicate that you have outstanding lab work and or testing that was ordered for you and has not yet been completed:      [address] [facility] 9 ([id number])